# Patient Record
Sex: MALE | Race: WHITE | NOT HISPANIC OR LATINO | Employment: OTHER | URBAN - METROPOLITAN AREA
[De-identification: names, ages, dates, MRNs, and addresses within clinical notes are randomized per-mention and may not be internally consistent; named-entity substitution may affect disease eponyms.]

---

## 2023-07-12 PROBLEM — N52.9 ED (ERECTILE DYSFUNCTION): Status: ACTIVE | Noted: 2023-07-12

## 2023-07-12 PROBLEM — M25.561 RIGHT KNEE PAIN: Status: ACTIVE | Noted: 2023-07-12

## 2023-07-12 PROBLEM — E55.9 VITAMIN D DEFICIENCY: Status: ACTIVE | Noted: 2023-07-12

## 2023-07-12 PROBLEM — R53.83 FATIGUE: Status: ACTIVE | Noted: 2023-07-12

## 2023-07-12 PROBLEM — R73.9 HYPERGLYCEMIA: Status: ACTIVE | Noted: 2023-07-12

## 2023-07-12 PROBLEM — L40.9 PSORIASIS: Status: ACTIVE | Noted: 2023-07-12

## 2023-07-12 PROBLEM — I10 HYPERTENSION: Status: ACTIVE | Noted: 2023-07-12

## 2023-07-12 PROBLEM — R00.1 SINUS BRADYCARDIA: Status: ACTIVE | Noted: 2023-07-12

## 2023-07-12 PROBLEM — N40.0 ENLARGED PROSTATE WITHOUT LOWER URINARY TRACT SYMPTOMS (LUTS): Status: ACTIVE | Noted: 2023-07-12

## 2023-07-12 PROBLEM — E78.5 HYPERLIPIDEMIA: Status: ACTIVE | Noted: 2023-07-12

## 2023-07-12 PROBLEM — L20.9 ATOPIC DERMATITIS: Status: ACTIVE | Noted: 2023-07-12

## 2023-07-12 PROBLEM — G43.909 MIGRAINE HEADACHE: Status: ACTIVE | Noted: 2023-07-12

## 2023-07-12 PROBLEM — F32.A DEPRESSION: Status: ACTIVE | Noted: 2023-07-12

## 2023-07-12 PROBLEM — J32.9 SINUSITIS: Status: ACTIVE | Noted: 2023-07-12

## 2023-07-12 PROBLEM — L30.9 DERMATITIS: Status: ACTIVE | Noted: 2023-07-12

## 2023-07-12 PROBLEM — H33.20 RETINAL DETACHMENT: Status: ACTIVE | Noted: 2023-07-12

## 2023-07-12 PROBLEM — L01.02 PUSTULAR FOLLICULITIS: Status: ACTIVE | Noted: 2023-07-12

## 2023-07-12 PROBLEM — K62.5 RECTAL BLEED: Status: ACTIVE | Noted: 2023-07-12

## 2023-07-12 PROBLEM — G47.00 INSOMNIA: Status: ACTIVE | Noted: 2023-07-12

## 2023-07-12 PROBLEM — R63.5 ABNORMAL WEIGHT GAIN: Status: ACTIVE | Noted: 2023-07-12

## 2023-07-12 PROBLEM — K21.9 ESOPHAGEAL REFLUX: Status: ACTIVE | Noted: 2023-07-12

## 2023-07-12 PROBLEM — F41.9 ANXIETY: Status: ACTIVE | Noted: 2023-07-12

## 2023-07-12 RX ORDER — TACROLIMUS 1 MG/G
OINTMENT TOPICAL
COMMUNITY
Start: 2021-05-24

## 2023-07-12 RX ORDER — AMLODIPINE BESYLATE 5 MG/1
1 TABLET ORAL DAILY
COMMUNITY
Start: 2020-08-18 | End: 2023-08-11 | Stop reason: SDUPTHER

## 2023-07-12 RX ORDER — BETAMETHASONE VALERATE 1 MG/G
CREAM TOPICAL
COMMUNITY
Start: 2015-06-17

## 2023-07-12 RX ORDER — FINASTERIDE 5 MG/1
1 TABLET, FILM COATED ORAL DAILY
COMMUNITY
Start: 2013-04-15 | End: 2023-08-11 | Stop reason: SDUPTHER

## 2023-07-12 RX ORDER — RIZATRIPTAN BENZOATE 10 MG/1
TABLET, ORALLY DISINTEGRATING ORAL
COMMUNITY
Start: 2013-10-17 | End: 2023-08-11 | Stop reason: SDUPTHER

## 2023-07-12 RX ORDER — TRAZODONE HYDROCHLORIDE 100 MG/1
TABLET ORAL
COMMUNITY
Start: 2015-10-14 | End: 2023-08-11 | Stop reason: SDUPTHER

## 2023-07-12 RX ORDER — TADALAFIL 20 MG/1
TABLET ORAL
COMMUNITY
Start: 2019-06-25 | End: 2023-08-11 | Stop reason: ALTCHOICE

## 2023-07-12 RX ORDER — FLUOXETINE HYDROCHLORIDE 40 MG/1
1 CAPSULE ORAL DAILY
COMMUNITY
Start: 2017-10-23 | End: 2023-08-11 | Stop reason: SDUPTHER

## 2023-08-11 ENCOUNTER — LAB (OUTPATIENT)
Dept: LAB | Facility: LAB | Age: 68
End: 2023-08-11
Payer: MEDICARE

## 2023-08-11 ENCOUNTER — OFFICE VISIT (OUTPATIENT)
Dept: PRIMARY CARE | Facility: CLINIC | Age: 68
End: 2023-08-11
Payer: MEDICARE

## 2023-08-11 VITALS
HEIGHT: 73 IN | TEMPERATURE: 97.2 F | BODY MASS INDEX: 27.46 KG/M2 | DIASTOLIC BLOOD PRESSURE: 82 MMHG | RESPIRATION RATE: 16 BRPM | OXYGEN SATURATION: 98 % | HEART RATE: 63 BPM | WEIGHT: 207.2 LBS | SYSTOLIC BLOOD PRESSURE: 128 MMHG

## 2023-08-11 DIAGNOSIS — Z00.00 HEALTH CARE MAINTENANCE: ICD-10-CM

## 2023-08-11 DIAGNOSIS — Z00.00 ROUTINE GENERAL MEDICAL EXAMINATION AT HEALTH CARE FACILITY: ICD-10-CM

## 2023-08-11 DIAGNOSIS — I10 HYPERTENSION, UNSPECIFIED TYPE: ICD-10-CM

## 2023-08-11 DIAGNOSIS — Z00.00 MEDICARE ANNUAL WELLNESS VISIT, SUBSEQUENT: ICD-10-CM

## 2023-08-11 DIAGNOSIS — R53.83 TIREDNESS: ICD-10-CM

## 2023-08-11 DIAGNOSIS — G47.00 INSOMNIA, UNSPECIFIED TYPE: ICD-10-CM

## 2023-08-11 DIAGNOSIS — Z00.00 ROUTINE GENERAL MEDICAL EXAMINATION AT HEALTH CARE FACILITY: Primary | ICD-10-CM

## 2023-08-11 DIAGNOSIS — N40.1 BENIGN PROSTATIC HYPERPLASIA WITH LOWER URINARY TRACT SYMPTOMS, SYMPTOM DETAILS UNSPECIFIED: ICD-10-CM

## 2023-08-11 DIAGNOSIS — H35.3222 EXUDATIVE AGE-RELATED MACULAR DEGENERATION, LEFT EYE, WITH INACTIVE CHOROIDAL NEOVASCULARIZATION (MULTI): ICD-10-CM

## 2023-08-11 DIAGNOSIS — G43.819 OTHER MIGRAINE WITHOUT STATUS MIGRAINOSUS, INTRACTABLE: ICD-10-CM

## 2023-08-11 DIAGNOSIS — F41.9 ANXIETY: ICD-10-CM

## 2023-08-11 LAB
ALANINE AMINOTRANSFERASE (SGPT) (U/L) IN SER/PLAS: 10 U/L (ref 10–52)
ALBUMIN (G/DL) IN SER/PLAS: 4.3 G/DL (ref 3.4–5)
ALKALINE PHOSPHATASE (U/L) IN SER/PLAS: 32 U/L (ref 33–136)
ANION GAP IN SER/PLAS: 12 MMOL/L (ref 10–20)
APPEARANCE, URINE: CLEAR
ASPARTATE AMINOTRANSFERASE (SGOT) (U/L) IN SER/PLAS: 11 U/L (ref 9–39)
BILIRUBIN TOTAL (MG/DL) IN SER/PLAS: 0.9 MG/DL (ref 0–1.2)
BILIRUBIN, URINE: NEGATIVE
BLOOD, URINE: NEGATIVE
CALCIUM (MG/DL) IN SER/PLAS: 9.8 MG/DL (ref 8.6–10.3)
CARBON DIOXIDE, TOTAL (MMOL/L) IN SER/PLAS: 31 MMOL/L (ref 21–32)
CHLORIDE (MMOL/L) IN SER/PLAS: 105 MMOL/L (ref 98–107)
CHOLESTEROL (MG/DL) IN SER/PLAS: 224 MG/DL (ref 0–199)
CHOLESTEROL IN HDL (MG/DL) IN SER/PLAS: 50.5 MG/DL
CHOLESTEROL/HDL RATIO: 4.4
COLOR, URINE: YELLOW
CREATININE (MG/DL) IN SER/PLAS: 1.03 MG/DL (ref 0.5–1.3)
ERYTHROCYTE DISTRIBUTION WIDTH (RATIO) BY AUTOMATED COUNT: 12.1 % (ref 11.5–14.5)
ERYTHROCYTE MEAN CORPUSCULAR HEMOGLOBIN CONCENTRATION (G/DL) BY AUTOMATED: 33.3 G/DL (ref 32–36)
ERYTHROCYTE MEAN CORPUSCULAR VOLUME (FL) BY AUTOMATED COUNT: 91 FL (ref 80–100)
ERYTHROCYTES (10*6/UL) IN BLOOD BY AUTOMATED COUNT: 5.11 X10E12/L (ref 4.5–5.9)
GFR MALE: 78 ML/MIN/1.73M2
GLUCOSE (MG/DL) IN SER/PLAS: 106 MG/DL (ref 74–99)
GLUCOSE, URINE: NEGATIVE MG/DL
HEMATOCRIT (%) IN BLOOD BY AUTOMATED COUNT: 46.3 % (ref 41–52)
HEMOGLOBIN (G/DL) IN BLOOD: 15.4 G/DL (ref 13.5–17.5)
KETONES, URINE: NEGATIVE MG/DL
LDL: 146 MG/DL (ref 0–99)
LEUKOCYTE ESTERASE, URINE: NEGATIVE
LEUKOCYTES (10*3/UL) IN BLOOD BY AUTOMATED COUNT: 5.4 X10E9/L (ref 4.4–11.3)
NITRITE, URINE: NEGATIVE
PH, URINE: 8 (ref 5–8)
PLATELETS (10*3/UL) IN BLOOD AUTOMATED COUNT: 163 X10E9/L (ref 150–450)
POTASSIUM (MMOL/L) IN SER/PLAS: 4.7 MMOL/L (ref 3.5–5.3)
PROTEIN TOTAL: 6.4 G/DL (ref 6.4–8.2)
PROTEIN, URINE: NEGATIVE MG/DL
SODIUM (MMOL/L) IN SER/PLAS: 143 MMOL/L (ref 136–145)
SPECIFIC GRAVITY, URINE: 1.01 (ref 1–1.03)
THYROTROPIN (MIU/L) IN SER/PLAS BY DETECTION LIMIT <= 0.05 MIU/L: 1.4 MIU/L (ref 0.44–3.98)
TRIGLYCERIDE (MG/DL) IN SER/PLAS: 139 MG/DL (ref 0–149)
UREA NITROGEN (MG/DL) IN SER/PLAS: 12 MG/DL (ref 6–23)
UROBILINOGEN, URINE: <2 MG/DL (ref 0–1.9)
VLDL: 28 MG/DL (ref 0–40)

## 2023-08-11 PROCEDURE — G0442 ANNUAL ALCOHOL SCREEN 15 MIN: HCPCS | Performed by: INTERNAL MEDICINE

## 2023-08-11 PROCEDURE — 93000 ELECTROCARDIOGRAM COMPLETE: CPT | Performed by: INTERNAL MEDICINE

## 2023-08-11 PROCEDURE — 84443 ASSAY THYROID STIM HORMONE: CPT

## 2023-08-11 PROCEDURE — 36415 COLL VENOUS BLD VENIPUNCTURE: CPT

## 2023-08-11 PROCEDURE — 99397 PER PM REEVAL EST PAT 65+ YR: CPT | Performed by: INTERNAL MEDICINE

## 2023-08-11 PROCEDURE — 80053 COMPREHEN METABOLIC PANEL: CPT

## 2023-08-11 PROCEDURE — 1036F TOBACCO NON-USER: CPT | Performed by: INTERNAL MEDICINE

## 2023-08-11 PROCEDURE — 3079F DIAST BP 80-89 MM HG: CPT | Performed by: INTERNAL MEDICINE

## 2023-08-11 PROCEDURE — 85027 COMPLETE CBC AUTOMATED: CPT

## 2023-08-11 PROCEDURE — G0439 PPPS, SUBSEQ VISIT: HCPCS | Performed by: INTERNAL MEDICINE

## 2023-08-11 PROCEDURE — G0444 DEPRESSION SCREEN ANNUAL: HCPCS | Performed by: INTERNAL MEDICINE

## 2023-08-11 PROCEDURE — 84154 ASSAY OF PSA FREE: CPT

## 2023-08-11 PROCEDURE — 3074F SYST BP LT 130 MM HG: CPT | Performed by: INTERNAL MEDICINE

## 2023-08-11 PROCEDURE — 1170F FXNL STATUS ASSESSED: CPT | Performed by: INTERNAL MEDICINE

## 2023-08-11 PROCEDURE — 84153 ASSAY OF PSA TOTAL: CPT

## 2023-08-11 PROCEDURE — 81003 URINALYSIS AUTO W/O SCOPE: CPT

## 2023-08-11 PROCEDURE — 99497 ADVNCD CARE PLAN 30 MIN: CPT | Performed by: INTERNAL MEDICINE

## 2023-08-11 PROCEDURE — 80061 LIPID PANEL: CPT

## 2023-08-11 PROCEDURE — 1159F MED LIST DOCD IN RCRD: CPT | Performed by: INTERNAL MEDICINE

## 2023-08-11 RX ORDER — AMLODIPINE BESYLATE 5 MG/1
5 TABLET ORAL DAILY
Qty: 90 TABLET | Refills: 3 | Status: SHIPPED | OUTPATIENT
Start: 2023-08-11

## 2023-08-11 RX ORDER — RIZATRIPTAN BENZOATE 10 MG/1
10 TABLET, ORALLY DISINTEGRATING ORAL ONCE AS NEEDED
Qty: 9 TABLET | Refills: 3 | Status: SHIPPED | OUTPATIENT
Start: 2023-08-11 | End: 2023-09-16

## 2023-08-11 RX ORDER — TRAZODONE HYDROCHLORIDE 100 MG/1
100 TABLET ORAL NIGHTLY
Qty: 90 TABLET | Refills: 3 | Status: SHIPPED | OUTPATIENT
Start: 2023-08-11

## 2023-08-11 RX ORDER — FINASTERIDE 5 MG/1
5 TABLET, FILM COATED ORAL DAILY
Qty: 90 TABLET | Refills: 3 | Status: SHIPPED | OUTPATIENT
Start: 2023-08-11

## 2023-08-11 RX ORDER — FLUOXETINE HYDROCHLORIDE 40 MG/1
40 CAPSULE ORAL DAILY
Qty: 90 CAPSULE | Refills: 3 | Status: SHIPPED | OUTPATIENT
Start: 2023-08-11

## 2023-08-11 ASSESSMENT — ACTIVITIES OF DAILY LIVING (ADL)
DRESSING: INDEPENDENT
DOING_HOUSEWORK: INDEPENDENT
MANAGING_FINANCES: INDEPENDENT
GROCERY_SHOPPING: INDEPENDENT
BATHING: INDEPENDENT
TAKING_MEDICATION: INDEPENDENT

## 2023-08-11 ASSESSMENT — PATIENT HEALTH QUESTIONNAIRE - PHQ9
2. FEELING DOWN, DEPRESSED OR HOPELESS: NOT AT ALL
SUM OF ALL RESPONSES TO PHQ9 QUESTIONS 1 AND 2: 0
1. LITTLE INTEREST OR PLEASURE IN DOING THINGS: NOT AT ALL

## 2023-08-11 NOTE — PROGRESS NOTES
Chief Complaint:   Medicare Wellness Exam/Comprehensive Problem Focused Follow Up and Physical Exam    HPI:  68-year-old patient presenting to the office today for his Medicare wellness subsequent visit as well as his annual examination.    Patient is doing fairly well he spends majority of his time in [place] and he comes back to [place] in the summer.  He denies any chest pain and shortness of breath not even on exertion, he denies abdominal pain nausea vomiting changes in bowel movements or blood in the stool, he denies urine symptoms.  He has no specific complaints or concerns today.  Assessment and Plan:  Problem List Items Addressed This Visit    None  68-year-old patient with the following issues.    1.  Essential benign hypertension, patient's blood pressure is adequate continue with the current medication no changes.  Refill on the amlodipine was provided.    2.  History of generalized anxiety disorder controlled with the use of fluoxetine the plan is to continue no changes.    3.  Benign prostatic hypertrophy stable with the use of finasteride refill was provided.    4.  Health maintenance issues, patient is up-to-date on his colonoscopy, PSA will be completed for prostate cancer screening.  Blood work will be obtained and we will discuss the results with the patient once available.  He is up-to-date on his vaccination.    5.  Medicare wellness visit was completed in office EKG and urine analysis was obtained although Medicare questions were answered.    Disposition, I will see the patient back in the office in 1 year for repeat physical and repeat Medicare wellness subsequent visit.  I will see him sooner if needed.  Refills of his medications were provided.    Patient Care Team:  Sandra Ramsey MD as PCP - General (Internal Medicine)  Milana Cheng MD as PCP - [facility] ACO Attributed Provider     Active Problem List  Patient Active Problem List   Diagnosis    Abnormal weight gain    Anxiety     Atopic dermatitis    Depression    ED (erectile dysfunction)    Enlarged prostate without lower urinary tract symptoms (luts)    Esophageal reflux    Fatigue    Hyperglycemia    Hyperlipidemia    Hypertension    Insomnia    Migraine headache    Dermatitis    Psoriasis    Pustular folliculitis    Rectal bleed    Retinal detachment    Right knee pain    Sinus bradycardia    Sinusitis    Vitamin D deficiency       Comprehensive Medical/Surgical/Social/Family History  Past Medical History:   Diagnosis Date    Allergic     Anxiety disorder, unspecified 06/14/2022    Anxiety    Eczema     GERD (gastroesophageal reflux disease)     Headache     Hypertension     Inflammatory bowel disease     Personal history of other diseases of the circulatory system     History of hypertension    Personal history of other mental and behavioral disorders     History of depression     Past Surgical History:   Procedure Laterality Date    COLON SURGERY      EYE SURGERY      TONSILLECTOMY      VASECTOMY      WISDOM TOOTH EXTRACTION       Social History     Social History Narrative    Not on file     Tobacco/Alcohol/Opioid use, as well as Illicit Drug Use was screened for/reviewed and documented in Social Documentation section of the chart and medication list as appropriate    Allergies and Medications  Lisinopril and Penicillins  Current Outpatient Medications   Medication Instructions    amLODIPine (Norvasc) 5 mg tablet 1 tablet, oral, Daily    betamethasone valerate (Valisone) 0.1 % cream Topical    finasteride (Proscar) 5 mg tablet 1 tablet, oral, Daily    FLUoxetine (PROzac) 40 mg capsule 1 tablet, oral, Daily    rizatriptan MLT (Maxalt-MLT) 10 mg disintegrating tablet oral    tacrolimus (Protopic) 0.1 % ointment Daily RT    traZODone (Desyrel) 100 mg tablet oral     Medications and Supplements  prescribed by me and other practitioners or clinical pharmacist (such as prescriptions, OTC's, herbal therapies and supplements) were reviewed  and documented in the medical record.      Activities of Daily Living  In your present state of health, do you have any difficulty performing the following activities?:   Preparing food and eating?: No  Bathing yourself: No  Getting dressed: No  Using the toilet:No  Moving around from place to place: No  In the past year have you fallen or had a near fall?:No  Able to manage finances independently: Yes  Able to perform grocery shopping: Yes  Able to manage medications independently: Yes  Able to do housework independently: Yes  Patient self-assessment of health status? Excellent    Depression Screen  Depression screening (15m) completed using the PHQ-2 questions with results documented in the chart/encounter  (See note and/or Rooming Screenings for documentation)    Current exercise habits:    Dietary issues discussed: Yes  Hearing difficulties: No  Safe in current home environment: Yes  Visual Acuity assessed: Yes  Cognitive Impairment No    Advance directives  Advanced Care Planning (including a Living Will, Healthcare POA, as well as specific end of life choices and/or directives), was discussed (~16 min) with the patient and/or surrogate, voluntarily, and details of that discussion documented in the Problem List (under Advanced Directives Discussion) of the medical record. Patient was encouraged to bring a copy of advanced directives to be placed in medical chart.    Cardiac Risk Assessment  Cardiovascular risk was discussed and, if needed, lifestyle modifications recommended, including nutritional choices, exercise, and elimination of habits contributing to risk. We agreed on a plan to reduce the current cardiovascular risk based on above discussion as needed.  Aspirin use/disuse was discussed and documented in the Problem List of the medical record (under Cardiac Risk Counseling) after reviewing the updated guidelines below:    Consider low dose Aspirin ( mg) use if the benefit for cardiovascular disease  "prevention outweighs risk for bleeding complications.   In general, low dose ASA should be considered:  In patients WITHOUT prior MI/stroke/PAD (primary prevention):   a. Age <60: Use if 10-year cardiovascular disease risk >20%, with discussion of risks and benefits with patient  b. Age 60-<70: Use if 10-year cardiovascular disease risk >20% and low bleeding (e.g., gastrointenstinal) risk, with discussion of risks and benefits with patient  c. Age >=70: Do not use    In patients WITH prior MI/stroke/PAD (secondary prevention):   Generally use unless extremely high bleeding (e.g., gastrointenstinal) risk, with discussion of risks and benefits with patient    ROS otherwise negative aside from what was mentioned above in HPI.    Vitals  /82   Pulse 63   Temp 36.2 °C (97.2 °F)   Resp 16   Ht 1.842 m (6' 0.5\")   Wt 94 kg (207 lb 3.2 oz)   SpO2 98%   BMI 27.72 kg/m²   Body mass index is 27.72 kg/m².  Physical Exam  Gen: Alert, NAD  HEENT:  Unremarkable  Neck:  No EZE  Respiratory:  Lungs CTAB  Cardiovascular:  Heart RRR  Neuro:  Gross motor and sensory intact  Skin:  No suspicious lesions present  Breast: No masses, or axillary lymphadenopathy      During the course of the visit the patient was educated and counseled about age appropriate screening and preventive services. Completed preventive screenings were documented in the chart and orders were placed for outstanding screenings/procedures as documented in the Assessment and Plan.    Patient Instructions (the written plan) was given to the patient at check out.  Subjective   Reason for Visit: Anirudh Mobley is an 68 y.o. male here for a Medicare Wellness visit.          Reviewed all medications by prescribing practitioner or clinical pharmacist (such as prescriptions, OTCs, herbal therapies and supplements) and documented in the medical record.    HPI    Patient Care Team:  Sandra Ramsey MD as PCP - General (Internal Medicine)  Milana Cheng MD " "as PCP - [facility] Attributed Provider     Review of Systems    Objective   Vitals:  /82   Pulse 63   Temp 36.2 °C (97.2 °F)   Resp 16   Ht 1.842 m (6' 0.5\")   Wt 94 kg (207 lb 3.2 oz)   SpO2 98%   BMI 27.72 kg/m²       Physical Exam    Assessment/Plan   Problem List Items Addressed This Visit          Cardiac and Vasculature    Hypertension    Relevant Medications    amLODIPine (Norvasc) 5 mg tablet       Eye    Exudative age-related macular degeneration, left eye, with inactive choroidal neovascularization (CMS/HCC)       Mental Health    Anxiety    Relevant Medications    FLUoxetine (PROzac) 40 mg capsule       Neuro    Migraine headache    Relevant Medications    rizatriptan MLT (Maxalt-MLT) 10 mg disintegrating tablet       Sleep    Insomnia    Relevant Medications    traZODone (Desyrel) 100 mg tablet     Other Visit Diagnoses       Routine general medical examination at health care facility    -  Primary    Health care maintenance        Relevant Orders    CBC    Comprehensive Metabolic Panel    Lipid Panel    PSA, Total and Free    TSH with reflex to Free T4 if abnormal    Follow Up In Advanced Primary Care - PCP [facility] Maintenance    Tiredness        Relevant Orders    CBC    Benign prostatic hyperplasia with lower urinary tract symptoms, symptom details unspecified        Relevant Medications    finasteride (Proscar) 5 mg tablet    Medicare annual wellness visit, subsequent        Relevant Orders    Follow Up In Advanced Primary Care - PCP - Medicare Annual    ECG 12 lead (Clinic Performed)    POCT UA Automated manually resulted               "

## 2023-08-14 LAB
PROSTATE SPECIFIC AG (NG/ML) IN SER/PLAS: 1.3 NG/ML (ref 0–4)
PROSTATE SPECIFIC AG FREE (NG/ML) IN SER/PLAS: 0.2 NG/ML
PROSTATE SPECIFIC AG FREE/PROSTATE SPECIFIC AG TOTAL IN SER/PLAS: 15 %

## 2024-08-15 ENCOUNTER — APPOINTMENT (OUTPATIENT)
Dept: PRIMARY CARE | Facility: CLINIC | Age: 69
End: 2024-08-15
Payer: MEDICARE

## 2024-08-15 ENCOUNTER — LAB (OUTPATIENT)
Dept: LAB | Facility: LAB | Age: 69
End: 2024-08-15
Payer: MEDICARE

## 2024-08-15 VITALS
SYSTOLIC BLOOD PRESSURE: 160 MMHG | BODY MASS INDEX: 28.31 KG/M2 | OXYGEN SATURATION: 96 % | HEIGHT: 73 IN | DIASTOLIC BLOOD PRESSURE: 90 MMHG | HEART RATE: 54 BPM | WEIGHT: 213.6 LBS

## 2024-08-15 DIAGNOSIS — Z00.00 ROUTINE HEALTH MAINTENANCE: Primary | ICD-10-CM

## 2024-08-15 DIAGNOSIS — G51.4 FACIAL TWITCHING: ICD-10-CM

## 2024-08-15 DIAGNOSIS — H35.3222 EXUDATIVE AGE-RELATED MACULAR DEGENERATION, LEFT EYE, WITH INACTIVE CHOROIDAL NEOVASCULARIZATION (MULTI): ICD-10-CM

## 2024-08-15 DIAGNOSIS — G43.819 OTHER MIGRAINE WITHOUT STATUS MIGRAINOSUS, INTRACTABLE: ICD-10-CM

## 2024-08-15 DIAGNOSIS — G47.00 INSOMNIA, UNSPECIFIED TYPE: ICD-10-CM

## 2024-08-15 DIAGNOSIS — Z13.6 SCREENING FOR CARDIOVASCULAR CONDITION: ICD-10-CM

## 2024-08-15 DIAGNOSIS — R25.1 TREMOR: ICD-10-CM

## 2024-08-15 DIAGNOSIS — I10 HYPERTENSION, UNSPECIFIED TYPE: ICD-10-CM

## 2024-08-15 DIAGNOSIS — Z00.00 ROUTINE HEALTH MAINTENANCE: ICD-10-CM

## 2024-08-15 DIAGNOSIS — F41.9 ANXIETY: ICD-10-CM

## 2024-08-15 DIAGNOSIS — N40.1 BENIGN PROSTATIC HYPERPLASIA WITH LOWER URINARY TRACT SYMPTOMS, SYMPTOM DETAILS UNSPECIFIED: ICD-10-CM

## 2024-08-15 DIAGNOSIS — J30.9 ALLERGIC RHINITIS, UNSPECIFIED SEASONALITY, UNSPECIFIED TRIGGER: ICD-10-CM

## 2024-08-15 DIAGNOSIS — Z00.00 MEDICARE ANNUAL WELLNESS VISIT, SUBSEQUENT: ICD-10-CM

## 2024-08-15 LAB
ALBUMIN SERPL BCP-MCNC: 4.2 G/DL (ref 3.4–5)
ALP SERPL-CCNC: 36 U/L (ref 33–136)
ALT SERPL W P-5'-P-CCNC: 10 U/L (ref 10–52)
ANION GAP SERPL CALC-SCNC: 12 MMOL/L (ref 10–20)
AST SERPL W P-5'-P-CCNC: 10 U/L (ref 9–39)
BILIRUB SERPL-MCNC: 0.9 MG/DL (ref 0–1.2)
BUN SERPL-MCNC: 14 MG/DL (ref 6–23)
CALCIUM SERPL-MCNC: 9.2 MG/DL (ref 8.6–10.6)
CHLORIDE SERPL-SCNC: 101 MMOL/L (ref 98–107)
CHOLEST SERPL-MCNC: 216 MG/DL (ref 0–199)
CHOLESTEROL/HDL RATIO: 3.8
CO2 SERPL-SCNC: 30 MMOL/L (ref 21–32)
CREAT SERPL-MCNC: 1.02 MG/DL (ref 0.5–1.3)
EGFRCR SERPLBLD CKD-EPI 2021: 79 ML/MIN/1.73M*2
ERYTHROCYTE [DISTWIDTH] IN BLOOD BY AUTOMATED COUNT: 12.3 % (ref 11.5–14.5)
GLUCOSE SERPL-MCNC: 105 MG/DL (ref 74–99)
HCT VFR BLD AUTO: 48.5 % (ref 41–52)
HDLC SERPL-MCNC: 56.9 MG/DL
HGB BLD-MCNC: 16.2 G/DL (ref 13.5–17.5)
LDLC SERPL CALC-MCNC: 134 MG/DL
MCH RBC QN AUTO: 30.1 PG (ref 26–34)
MCHC RBC AUTO-ENTMCNC: 33.4 G/DL (ref 32–36)
MCV RBC AUTO: 90 FL (ref 80–100)
NON HDL CHOLESTEROL: 159 MG/DL (ref 0–149)
NRBC BLD-RTO: 0 /100 WBCS (ref 0–0)
PLATELET # BLD AUTO: 222 X10*3/UL (ref 150–450)
POTASSIUM SERPL-SCNC: 4 MMOL/L (ref 3.5–5.3)
PROT SERPL-MCNC: 6.5 G/DL (ref 6.4–8.2)
RBC # BLD AUTO: 5.39 X10*6/UL (ref 4.5–5.9)
SODIUM SERPL-SCNC: 139 MMOL/L (ref 136–145)
TRIGL SERPL-MCNC: 126 MG/DL (ref 0–149)
TSH SERPL-ACNC: 1.28 MIU/L (ref 0.44–3.98)
VLDL: 25 MG/DL (ref 0–40)
WBC # BLD AUTO: 6.5 X10*3/UL (ref 4.4–11.3)

## 2024-08-15 PROCEDURE — 84153 ASSAY OF PSA TOTAL: CPT

## 2024-08-15 PROCEDURE — 36415 COLL VENOUS BLD VENIPUNCTURE: CPT

## 2024-08-15 PROCEDURE — 80053 COMPREHEN METABOLIC PANEL: CPT

## 2024-08-15 PROCEDURE — 85027 COMPLETE CBC AUTOMATED: CPT

## 2024-08-15 PROCEDURE — 80061 LIPID PANEL: CPT

## 2024-08-15 PROCEDURE — 84443 ASSAY THYROID STIM HORMONE: CPT

## 2024-08-15 PROCEDURE — 84154 ASSAY OF PSA FREE: CPT

## 2024-08-15 RX ORDER — FLUOXETINE HYDROCHLORIDE 40 MG/1
40 CAPSULE ORAL DAILY
Qty: 90 CAPSULE | Refills: 3 | Status: SHIPPED | OUTPATIENT
Start: 2024-08-15

## 2024-08-15 RX ORDER — FINASTERIDE 5 MG/1
5 TABLET, FILM COATED ORAL DAILY
Qty: 90 TABLET | Refills: 3 | Status: SHIPPED | OUTPATIENT
Start: 2024-08-15

## 2024-08-15 RX ORDER — AMLODIPINE BESYLATE 5 MG/1
5 TABLET ORAL DAILY
Qty: 90 TABLET | Refills: 3 | Status: SHIPPED | OUTPATIENT
Start: 2024-08-15

## 2024-08-15 RX ORDER — TRAZODONE HYDROCHLORIDE 100 MG/1
100 TABLET ORAL NIGHTLY
Qty: 90 TABLET | Refills: 3 | Status: SHIPPED | OUTPATIENT
Start: 2024-08-15

## 2024-08-15 ASSESSMENT — ACTIVITIES OF DAILY LIVING (ADL)
DRESSING: INDEPENDENT
DOING_HOUSEWORK: INDEPENDENT
TAKING_MEDICATION: INDEPENDENT
MANAGING_FINANCES: INDEPENDENT
BATHING: INDEPENDENT
GROCERY_SHOPPING: INDEPENDENT

## 2024-08-15 ASSESSMENT — PATIENT HEALTH QUESTIONNAIRE - PHQ9
SUM OF ALL RESPONSES TO PHQ9 QUESTIONS 1 AND 2: 0
1. LITTLE INTEREST OR PLEASURE IN DOING THINGS: NOT AT ALL
2. FEELING DOWN, DEPRESSED OR HOPELESS: NOT AT ALL

## 2024-08-15 NOTE — PROGRESS NOTES
Annual Medicare Wellness Exam/Comprehensive Problem Focused Follow Up  HPI/CC  Chief Complaint   Patient presents with    Medicare Annual Wellness Visit Subsequent   69-year-old patient presented to the office today for his Medicare wellness as well as his annual exam.  Patient is known to have history of hypertension as well as history of anxiety.  He seems to be doing fairly well he denies any chest pain or difficulty breathing not even on exertion he denies abdominal pain nausea or vomiting changes in the bowel movement or blood in his stool he denies urine symptoms.    Him and his wife shared concerns about possible memory changes or cognitive concerns and they are interested in proceeding with neuropsych testing.    His wife shared concerns that he has some new twitching in his eyebrows and he used to have some twitching in his lips and he had never had that evaluated also she shared concerns regarding tremors as well.    Patient shared with me his excessive alcohol consumption he drinks 4-5 alcoholic beverages every night and he has been doing this for at least 10 years he is very adamant that this is not giving him any harm in any way and he wants to continue despite the counseling we had today about the risk of excessive alcohol consumption on the liver and the other comorbidities associated with it.    Assessment and Plan:  Problem List Items Addressed This Visit    None  Visit Diagnoses       Medicare annual wellness visit, subsequent        Routine health maintenance        Relevant Orders    ECG 12 lead (Clinic Performed)          69-year-old patient with the following issues.    1.  Hypertension patient's blood pressure is elevated he does me at home it is not elevated so he is going to bring his machine and his readings to my office within the next 2 to 4 weeks for follow-up for comparison to confirm the accuracy.  No changes today.    2.  Anxiety on fluoxetine the plan is to continue.    3.  Insomnia  "on trazodone the plan is to continue.    4.  Concerns regarding facial muscle twitching and tremors referred to neurology was provided.    5.  Concerns regarding allergic rhinosinusitis symptoms patient is interested in meeting with the allergy department referral was provided.    6.  Migraine headaches less frequent and less intense no changes on his current regimen.    7.  Hyperlipidemia managed with diet and lifestyle modification we will see what his lipid profile is and we have discussed the treatment plan with him I would like to proceed with calcium scoring and that will factor into our discussion regarding treatment and risk factor modification.    8.  Healthcare maintenance issues lab work is requested PSA is requested colon cancer screening is up-to-date.    9.  Excessive alcohol consumption I did spend significant amount of time over 10 to 15 minutes today educating the patient about the risk of comorbidities associated with excessive alcohol consumption and he stated understanding liver function test will be obtained.    10.  Referral to neuropsych was provided for evaluation of memory concerns and cognitive concerns.    Medicare wellness visit was completed with its requirements.    No other active issues or concerns I will see the patient back in 1 week for repeat physical and in 4 to 6 weeks for follow-up with his blood pressure machine.  ROS otherwise negative aside from what was mentioned above in HPI.    Vitals  /86 (BP Location: Right arm, Patient Position: Sitting, BP Cuff Size: Adult)   Pulse 54   Ht 1.842 m (6' 0.5\")   Wt 96.9 kg (213 lb 9.6 oz)   SpO2 96%   BMI 28.57 kg/m²   Body mass index is 28.57 kg/m².  Physical Exam  Gen: Alert, NAD  HEENT:  Unremarkable  Neck:  No EZE  Respiratory:  Lungs CTAB  Cardiovascular:  Heart RRR  Neuro:  Gross motor and sensory intact  Skin:  No suspicious lesions present  Breast: No masses, or axillary lymphadenopathy      Patient Care Team:  Sandra" MD Roxy as PCP - General (Internal Medicine)  Sandra Ramsey MD as PCP - [facility] ACO Attributed Provider       Activities of Daily Living  In your present state of health, do you have any difficulty performing the following activities?:   Preparing food and eating?: No  Bathing yourself: No  Getting dressed: No  Using the toilet:No  Moving around from place to place: No  In the past year have you fallen or had a near fall?:No  Able to manage finances independently: Yes  Able to perform grocery shopping: Yes  Able to manage medications independently: Yes  Able to do housework independently: Yes  Patient self-assessment of health status? Good    Current exercise habits:  walking   Dietary issues discussed: Yes  Hearing difficulties: No  Safe in current home environment: Yes  Visual Acuity assessed: Yes  Cognitive Impairment No    5 minutes spent on SDOH screening:   Specifically Housing, Food Insecurity, Utilities and Transportatin Needs were evaluated   (See Screenings in Rooming section for documentation)  Food Insecurity: Not on file     Housing Stability: Not on file     Transportation Needs: Not on file       Allergies and Medications  Lisinopril and Penicillins  Current Outpatient Medications   Medication Instructions    amLODIPine (NORVASC) 5 mg, oral, Daily    betamethasone valerate (Valisone) 0.1 % cream Topical    finasteride (PROSCAR) 5 mg, oral, Daily    FLUoxetine (PROZAC) 40 mg, oral, Daily    rizatriptan MLT (MAXALT-MLT) 10 mg, oral, Once as needed    tacrolimus (Protopic) 0.1 % ointment Daily RT    traZODone (DESYREL) 100 mg, oral, Nightly       Medications and Supplements  prescribed by me and other practitioners or clinical pharmacist (such as prescriptions, OTC's, herbal therapies and supplements) were reviewed and documented in the medical record.      Active Problem List  Patient Active Problem List   Diagnosis    Abnormal weight gain    Anxiety    Atopic dermatitis    Depression    ED  (erectile dysfunction)    Enlarged prostate without lower urinary tract symptoms (luts)    Esophageal reflux    Fatigue    Hyperglycemia    Hyperlipidemia    Hypertension    Insomnia    Migraine headache    Dermatitis    Psoriasis    Pustular folliculitis    Rectal bleed    Retinal detachment    Right knee pain    Sinus bradycardia    Sinusitis    Vitamin D deficiency    Exudative age-related macular degeneration, left eye, with inactive choroidal neovascularization (Multi)       Comprehensive Medical/Surgical/Social/Family History  Past Medical History:   Diagnosis Date    Allergic     Anxiety disorder, unspecified 06/14/2022    Anxiety    Eczema     GERD (gastroesophageal reflux disease)     Headache     Hypertension     Inflammatory bowel disease     Personal history of other diseases of the circulatory system     History of hypertension    Personal history of other mental and behavioral disorders     History of depression     Past Surgical History:   Procedure Laterality Date    COLON SURGERY      EYE SURGERY      TONSILLECTOMY      VASECTOMY      WISDOM TOOTH EXTRACTION       Social History     Social History Narrative    Not on file         Tobacco/Alcohol/Opioid use, as well as Illicit Drug Use was screened for/reviewed and documented in Social Documentation section of the chart and medication list as appropriate     Depression Screening  Depression screening completed using the PHQ-2 questions, with results documented in the chart/encounter (5 min spent on this).  (See Rooming Screening section for documentation, and/or progress note for additional information)    Cardiac Risk Assessment (15 minutes spent on this)  The 10-year ASCVD risk score (Ha AHMADI, et al., 2019) is: 26.8%    Values used to calculate the score:      Age: 69 years      Sex: Male      Is Non- : No      Diabetic: No      Tobacco smoker: No      Systolic Blood Pressure: 140 mmHg      Is BP treated: Yes      HDL  Cholesterol: 50.5 mg/dL      Total Cholesterol: 224 mg/dL    Cardiovascular risk was discussed and, if needed, lifestyle modifications recommended, including nutritional choices, exercise, and elimination of habits contributing to risk. We agreed on a plan to reduce the current cardiovascular risk based on above discussion as needed.     Aspirin use/disuse was discussed and documented in the Problem List of the medical record (under Cardiac Risk Counseling) after reviewing the updated guidelines below:  Consider low dose Aspirin ( mg) use if the benefit for cardiovascular disease prevention outweighs risk for bleeding complications.   Discussed that in general, low dose ASA should be considered:  In patients WITHOUT prior MI/stroke/PAD (primary prevention):   a. Age <60: Use if 10-year cardiovascular disease risk >20%, with discussion of risks and benefits with patient  b. Age 60-<70: Use if 10-year cardiovascular disease risk >20% and low bleeding (e.g., gastrointestinal) risk, with discussion of risks and benefits with patient  c. Age >=70: Do not use    In patients WITH prior MI/stroke/PAD (secondary prevention):   Generally use unless extremely high bleeding (e.g., gastrointenstinal) risk, with discussion of risks and benefits with patient    Advance Directives Discussion  Advanced Care Planning (including a Living Will, Healthcare POA, as well as specific end of life choices and/or directives), was discussed with the patient and/or surrogate, voluntarily, and details of that discussion documented in the Problem List (under Advanced Directives Discussion) of the medical record.   (16 min spent discussing above)     During the course of the visit the patient was educated and counseled about age appropriate screening and preventive services.   Completed preventive screenings were documented in the chart (see Routine Health Maintenance in Problem List) and orders were placed for outstanding  screenings/procedures as documented in the Assessment and Plan.    Patient Instructions (the written plan) was given to the patient at check out as part of the AVS.

## 2024-08-18 LAB
PSA FREE MFR SERPL: 14 %
PSA FREE SERPL-MCNC: 0.2 NG/ML
PSA SERPL IA-MCNC: 1.4 NG/ML (ref 0–4)

## 2024-08-22 ENCOUNTER — PATIENT MESSAGE (OUTPATIENT)
Dept: PRIMARY CARE | Facility: CLINIC | Age: 69
End: 2024-08-22
Payer: MEDICARE

## 2024-08-22 DIAGNOSIS — R41.89 COGNITIVE DECLINE: Primary | ICD-10-CM

## 2024-08-22 DIAGNOSIS — R41.3 MEMORY CHANGES: ICD-10-CM

## 2024-08-29 NOTE — PATIENT COMMUNICATION
Spoke with patient wife. Chasity is requesting a neuropsychology referral for herself and one for Anirudh for cognitive function evaluations.

## 2024-09-26 ENCOUNTER — HOSPITAL ENCOUNTER (OUTPATIENT)
Dept: RADIOLOGY | Facility: HOSPITAL | Age: 69
Discharge: HOME | End: 2024-09-26
Payer: MEDICARE

## 2024-09-26 DIAGNOSIS — R93.1 ELEVATED CORONARY ARTERY CALCIUM SCORE: Primary | ICD-10-CM

## 2024-09-26 DIAGNOSIS — Z13.6 SCREENING FOR CARDIOVASCULAR CONDITION: ICD-10-CM

## 2024-09-26 PROCEDURE — 75571 CT HRT W/O DYE W/CA TEST: CPT

## 2024-09-30 ENCOUNTER — APPOINTMENT (OUTPATIENT)
Dept: ALLERGY | Facility: CLINIC | Age: 69
End: 2024-09-30
Payer: MEDICARE

## 2024-10-16 ENCOUNTER — APPOINTMENT (OUTPATIENT)
Dept: CARDIOLOGY | Facility: CLINIC | Age: 69
End: 2024-10-16
Payer: MEDICARE

## 2024-10-16 VITALS
HEART RATE: 64 BPM | DIASTOLIC BLOOD PRESSURE: 74 MMHG | BODY MASS INDEX: 28.71 KG/M2 | SYSTOLIC BLOOD PRESSURE: 136 MMHG | WEIGHT: 216.6 LBS | HEIGHT: 73 IN | OXYGEN SATURATION: 97 %

## 2024-10-16 DIAGNOSIS — R93.1 ELEVATED CORONARY ARTERY CALCIUM SCORE: Primary | ICD-10-CM

## 2024-10-16 DIAGNOSIS — E78.2 MIXED HYPERLIPIDEMIA: ICD-10-CM

## 2024-10-16 PROCEDURE — 1123F ACP DISCUSS/DSCN MKR DOCD: CPT | Performed by: NURSE PRACTITIONER

## 2024-10-16 PROCEDURE — 3075F SYST BP GE 130 - 139MM HG: CPT | Performed by: NURSE PRACTITIONER

## 2024-10-16 PROCEDURE — 1036F TOBACCO NON-USER: CPT | Performed by: NURSE PRACTITIONER

## 2024-10-16 PROCEDURE — 1160F RVW MEDS BY RX/DR IN RCRD: CPT | Performed by: NURSE PRACTITIONER

## 2024-10-16 PROCEDURE — 99204 OFFICE O/P NEW MOD 45 MIN: CPT | Performed by: NURSE PRACTITIONER

## 2024-10-16 PROCEDURE — 3078F DIAST BP <80 MM HG: CPT | Performed by: NURSE PRACTITIONER

## 2024-10-16 PROCEDURE — 93000 ELECTROCARDIOGRAM COMPLETE: CPT | Performed by: NURSE PRACTITIONER

## 2024-10-16 PROCEDURE — 3008F BODY MASS INDEX DOCD: CPT | Performed by: NURSE PRACTITIONER

## 2024-10-16 PROCEDURE — 1159F MED LIST DOCD IN RCRD: CPT | Performed by: NURSE PRACTITIONER

## 2024-10-16 RX ORDER — FERROUS SULFATE 325(65) MG
325 TABLET ORAL
COMMUNITY

## 2024-10-16 RX ORDER — LANOLIN ALCOHOL/MO/W.PET/CERES
1000 CREAM (GRAM) TOPICAL
COMMUNITY

## 2024-10-16 RX ORDER — CALCIUM CARBONATE/VITAMIN D3 500-10/5ML
1 LIQUID (ML) ORAL DAILY
COMMUNITY

## 2024-10-16 RX ORDER — RIBOFLAVIN (VITAMIN B2) 50 MG
50 TABLET ORAL DAILY
COMMUNITY

## 2024-10-16 RX ORDER — OMEPRAZOLE 40 MG/1
40 CAPSULE, DELAYED RELEASE ORAL
COMMUNITY
Start: 2024-10-15

## 2024-10-16 NOTE — PROGRESS NOTES
"Name : Anirudh Mobley   : 1955   MRN : [id number]   ENC Date : 10/16/2024    CC:   Elevated Calcium Score test and New Patient Visit     HPI:    Anirudh Mobley \"Roberth\" is a 69 y.o. male with PMHx sig for HTN, HLD, BPH, IBS s/p colon resection & GERD who presents today for cardiovascular evaluation after having an elevated CAC score.    Denies any chest pain, pressure, SOB/HERNADEZ, PND, orthopnea, LE edema, palpitations, lightheadedness, dizziness, or syncope.     Reports that he had an exercise stress test at the age of 38 that was \"precautionary\".    Exercise: primary method of exercise is walking, not in [place] 2/2 projects he's been doing in his house. But when in [place], walks a lot & course is hilly. Loses 16 lbs when he goes there.    Lives in [place] 6 months of the year.     Reports that most of the males in his family die between ages mid 30s & 5os.     CV Diagnostics:  EKG today shows Sinus Rhythm, no ischemic changes    CAC score 24:  LM 31.7,  .32,  LCx 56,  .24,  Total 315.26    ROS: unless otherwise noted in the history of present illness, all other systems were reviewed and they are negative for complaints     PMH:  As above    PSH:  He has a past surgical history that includes Colon surgery; Eye surgery; Tonsillectomy; Vasectomy; and [place] tooth extraction.  Colon resection (about 10 years ago)    SHx:  He reports that he has never smoked. He has been exposed to tobacco smoke. He has never used smokeless tobacco. He reports current alcohol use of about 21.0 standard drinks of alcohol per week. He reports that he does not use drugs.    FHx: as above     Allergies:  Lisinopril and Penicillins    Outpatient Medications:  Current Outpatient Medications   Medication Instructions    amLODIPine (NORVASC) 5 mg, oral, Daily    betamethasone valerate (Valisone) 0.1 % cream Apply topically.    cyanocobalamin (VITAMIN B-12) 1,000 mcg, Daily RT    ferrous sulfate (325 mg ferrous sulfate) " "325 mg, Daily RT    finasteride (PROSCAR) 5 mg, oral, Daily    FLUoxetine (PROZAC) 40 mg, oral, Daily    magnesium oxide 400 mg magnesium capsule 1 capsule, Daily    omeprazole (PRILOSEC) 40 mg, Daily RT    riboflavin (VITAMIN B2) 50 mg, Daily    rizatriptan MLT (MAXALT-MLT) 10 mg, oral, Once as needed    tacrolimus (Protopic) 0.1 % ointment Daily RT    traZODone (DESYREL) 100 mg, oral, Nightly    VITAMIN K2 ORAL 1 capsule, Daily     Last Recorded Vitals:  Vitals:    10/16/24 0823   BP: 136/74   BP Location: Left arm   Patient Position: Sitting   Pulse: 64   SpO2: 97%   Weight: 98.2 kg (216 lb 9.6 oz)   Height: 1.842 m (6' 0.5\")     Physical Exam:  On exam Mr. Anirudh Mobley appears his stated age, is alert and oriented x3, and in no acute distress. His sclera are anicteric and his oropharynx has moist mucous membranes. His neck is supple and without thyromegaly. The JVP is ~5 cm of water above the right atrium. His cardiac exam has regular rhythm, normal S1, S2. No S3/4. There are no murmurs. His lungs are clear to auscultation bilaterally and there is no dullness to percussion. His abdomen is soft, nontender with normoactive bowel sounds. There is no HJR. The extremities are warm and without edema. The skin is dry. There is no rash present. The distal pulses are 2-3+ in all four extremities. His mood and affect are appropriate for todays encounter.      Last Labs:  CBC -  Lab Results   Component Value Date    WBC 6.5 08/15/2024    HGB 16.2 08/15/2024    HCT 48.5 08/15/2024    MCV 90 08/15/2024     08/15/2024       CMP -  Lab Results   Component Value Date    CALCIUM 9.2 08/15/2024    PROT 6.5 08/15/2024    ALBUMIN 4.2 08/15/2024    AST 10 08/15/2024    ALT 10 08/15/2024    ALKPHOS 36 08/15/2024    BILITOT 0.9 08/15/2024       LIPID PANEL -   Lab Results   Component Value Date    CHOL 216 (H) 08/15/2024    TRIG 126 08/15/2024    HDL 56.9 08/15/2024    CHHDL 3.8 08/15/2024    LDLF 146 (H) 08/11/2023    VLDL " "25 08/15/2024    NHDL 159 (H) 08/15/2024       RENAL FUNCTION PANEL -   Lab Results   Component Value Date    GLUCOSE 105 (H) 08/15/2024     08/15/2024    K 4.0 08/15/2024     08/15/2024    CO2 30 08/15/2024    ANIONGAP 12 08/15/2024    BUN 14 08/15/2024    CREATININE 1.02 08/15/2024    GFRMALE 78 08/11/2023    CALCIUM 9.2 08/15/2024    ALBUMIN 4.2 08/15/2024        No results found for: \"BNP\", \"HGBA1C\"  I have reviewed the above labs & diagnostics    Assessment/Plan:  Elevated coronary artery calcium score. EKG without ischemic changes Relatively active with no anginal symptoms. Risk factors for CAD include age & HLD. Family history is concerning though. Will get exercise stress test for further evaluation.     Hyperlipidemia. Currently not on statin. Consider initiation.    Follow up after testing    Tracy M Schwab, APRN-CNP  "

## 2024-10-21 ENCOUNTER — HOSPITAL ENCOUNTER (OUTPATIENT)
Dept: CARDIOLOGY | Facility: HOSPITAL | Age: 69
Discharge: HOME | End: 2024-10-21
Payer: MEDICARE

## 2024-10-21 DIAGNOSIS — R93.1 ELEVATED CORONARY ARTERY CALCIUM SCORE: ICD-10-CM

## 2024-10-21 PROCEDURE — 93016 CV STRESS TEST SUPVJ ONLY: CPT | Performed by: INTERNAL MEDICINE

## 2024-10-21 PROCEDURE — 93018 CV STRESS TEST I&R ONLY: CPT | Performed by: INTERNAL MEDICINE

## 2024-10-21 PROCEDURE — 93017 CV STRESS TEST TRACING ONLY: CPT

## 2024-10-30 ENCOUNTER — APPOINTMENT (OUTPATIENT)
Dept: CARDIOLOGY | Facility: CLINIC | Age: 69
End: 2024-10-30
Payer: MEDICARE

## 2024-11-08 ENCOUNTER — APPOINTMENT (OUTPATIENT)
Dept: CARDIOLOGY | Facility: CLINIC | Age: 69
End: 2024-11-08
Payer: MEDICARE

## 2024-11-08 VITALS
DIASTOLIC BLOOD PRESSURE: 72 MMHG | SYSTOLIC BLOOD PRESSURE: 134 MMHG | HEART RATE: 64 BPM | BODY MASS INDEX: 27.91 KG/M2 | OXYGEN SATURATION: 97 % | HEIGHT: 73 IN | WEIGHT: 210.6 LBS

## 2024-11-08 DIAGNOSIS — R93.1 ELEVATED CORONARY ARTERY CALCIUM SCORE: Primary | ICD-10-CM

## 2024-11-08 DIAGNOSIS — I10 PRIMARY HYPERTENSION: ICD-10-CM

## 2024-11-08 DIAGNOSIS — E78.2 MIXED HYPERLIPIDEMIA: ICD-10-CM

## 2024-11-08 PROCEDURE — 99214 OFFICE O/P EST MOD 30 MIN: CPT | Performed by: NURSE PRACTITIONER

## 2024-11-08 PROCEDURE — G2211 COMPLEX E/M VISIT ADD ON: HCPCS | Performed by: NURSE PRACTITIONER

## 2024-11-08 PROCEDURE — 3075F SYST BP GE 130 - 139MM HG: CPT | Performed by: NURSE PRACTITIONER

## 2024-11-08 PROCEDURE — 3008F BODY MASS INDEX DOCD: CPT | Performed by: NURSE PRACTITIONER

## 2024-11-08 PROCEDURE — 1160F RVW MEDS BY RX/DR IN RCRD: CPT | Performed by: NURSE PRACTITIONER

## 2024-11-08 PROCEDURE — 1159F MED LIST DOCD IN RCRD: CPT | Performed by: NURSE PRACTITIONER

## 2024-11-08 PROCEDURE — 1036F TOBACCO NON-USER: CPT | Performed by: NURSE PRACTITIONER

## 2024-11-08 PROCEDURE — 1123F ACP DISCUSS/DSCN MKR DOCD: CPT | Performed by: NURSE PRACTITIONER

## 2024-11-08 PROCEDURE — 3078F DIAST BP <80 MM HG: CPT | Performed by: NURSE PRACTITIONER

## 2024-11-08 RX ORDER — AMLODIPINE BESYLATE 10 MG/1
10 TABLET ORAL DAILY
Qty: 90 TABLET | Refills: 3 | Status: SHIPPED | OUTPATIENT
Start: 2024-11-08 | End: 2025-11-08

## 2024-11-08 RX ORDER — ATORVASTATIN CALCIUM 40 MG/1
40 TABLET, FILM COATED ORAL DAILY
Qty: 90 TABLET | Refills: 3 | Status: SHIPPED | OUTPATIENT
Start: 2024-11-08 | End: 2025-11-08

## 2024-11-08 NOTE — PATIENT INSTRUCTIONS
- increase Amlodipine to 10mg once a day  - start Atorvastatin 40mg once a day at night  - Blood work in 2 weeks  - Follow up with me in 6 months    It was my pleasure to meet you. I look forward to being your cardiac Nurse Practitioner. I am a huge believer in communicating with my patients. Please contact me at any time, if anything is not clear to you regarding anything we have discussed, or if new questions occur to you.

## 2024-11-08 NOTE — PROGRESS NOTES
"Name : Anirudh Mobley   : 1955   MRN : [id number]   ENC Date : 2024    CC:   Follow-up      HPI:    Anirudh Mobley \"Roberth\" is a 69 y.o. male with PMHx sig for coronary artery calcification, HTN, HLD, BPH, IBS s/p colon resection & GERD who presents with his Wife today as above.    Denies any chest pain, pressure, SOB/HERNADEZ, PND, orthopnea, LE edema, palpitations, lightheadedness, dizziness, or syncope.     Reports that he had an exercise stress test at the age of 38 that was \"precautionary\".    Exercise: primary method of exercise is walking, not in [place] 2/2 projects he's been doing in his house. But when in [place], walks a lot & course is hilly. Loses 16 lbs when he goes there.    Lives in [place] 6 months of the year.     Reports that most of the males in his family die between ages mid 30s & 50s.     CV Diagnostics:  Exercise stress 10/21/24:   1. Average functional capacity for age (7.0 METS).   2. Hypertensive BP response to exercise.   3. No chest pain during stress or recovery.   4. No evidence of stress induced arrhythmias.   5. Adequate level of stress achieved.   6. No electrocardiographic evidence for ischemia at maximal workload.   7. Normal ECG.   8. Normal Stress Test.    EKG today shows Sinus Rhythm, no ischemic changes    CAC score 24:  LM 31.7,  .32,  LCx 56,  .24,  Total 315.26    ROS: unless otherwise noted in the history of present illness, all other systems were reviewed and they are negative for complaints     Allergies:  Lisinopril and Penicillins    Outpatient Medications:  Current Outpatient Medications   Medication Instructions    amLODIPine (NORVASC) 5 mg, oral, Daily    betamethasone valerate (Valisone) 0.1 % cream Apply topically.    cyanocobalamin (VITAMIN B-12) 1,000 mcg, Daily RT    ferrous sulfate (325 mg ferrous sulfate) 325 mg, Daily RT    finasteride (PROSCAR) 5 mg, oral, Daily    FLUoxetine (PROZAC) 40 mg, oral, Daily    magnesium oxide 400 mg " "magnesium capsule 1 capsule, Daily    omeprazole (PRILOSEC) 40 mg, Daily RT    riboflavin (VITAMIN B2) 50 mg, Daily    rizatriptan MLT (MAXALT-MLT) 10 mg, oral, Once as needed    tacrolimus (Protopic) 0.1 % ointment Daily RT    traZODone (DESYREL) 100 mg, oral, Nightly    VITAMIN K2 ORAL 1 capsule, Daily     Last Recorded Vitals:  Vitals:    11/08/24 1436   BP: 134/72   BP Location: Left arm   Patient Position: Sitting   Pulse: 64   SpO2: 97%   Weight: 95.5 kg (210 lb 9.6 oz)   Height: 1.842 m (6' 0.5\")     Physical Exam:  On exam Mr. Anirudh Mobley appears his stated age, is alert and oriented x3, and in no acute distress. His sclera are anicteric and his oropharynx has moist mucous membranes. His neck is supple and without thyromegaly. The JVP is ~5 cm of water above the right atrium. His cardiac exam has regular rhythm, normal S1, S2. No S3/4. There are no murmurs. His lungs are clear to auscultation bilaterally and there is no dullness to percussion. His abdomen is soft, nontender with normoactive bowel sounds. There is no HJR. The extremities are warm and without edema. The skin is dry. There is no rash present. The distal pulses are 2-3+ in all four extremities. His mood and affect are appropriate for todays encounter.      Last Labs:  CBC -  Lab Results   Component Value Date    WBC 6.5 08/15/2024    HGB 16.2 08/15/2024    HCT 48.5 08/15/2024    MCV 90 08/15/2024     08/15/2024       CMP -  Lab Results   Component Value Date    CALCIUM 9.2 08/15/2024    PROT 6.5 08/15/2024    ALBUMIN 4.2 08/15/2024    AST 10 08/15/2024    ALT 10 08/15/2024    ALKPHOS 36 08/15/2024    BILITOT 0.9 08/15/2024       LIPID PANEL -   Lab Results   Component Value Date    CHOL 216 (H) 08/15/2024    TRIG 126 08/15/2024    HDL 56.9 08/15/2024    CHHDL 3.8 08/15/2024    LDLF 146 (H) 08/11/2023    VLDL 25 08/15/2024    NHDL 159 (H) 08/15/2024       RENAL FUNCTION PANEL -   Lab Results   Component Value Date    GLUCOSE 105 (H) " "08/15/2024     08/15/2024    K 4.0 08/15/2024     08/15/2024    CO2 30 08/15/2024    ANIONGAP 12 08/15/2024    BUN 14 08/15/2024    CREATININE 1.02 08/15/2024    GFRMALE 78 08/11/2023    CALCIUM 9.2 08/15/2024    ALBUMIN 4.2 08/15/2024        No results found for: \"BNP\", \"HGBA1C\"  I have reviewed the above labs & diagnostics    Assessment/Plan:  Elevated coronary artery calcium score. Exercise stress test was normal. Given score & HLD, would start statin therapy    Hyperlipidemia. Start atorvastatin 40mg every day. Repeat lipid panel when I see him in 6 months    Hypertension. Relatively controlled. Would increase amlodipine to 10mg every day     CMP 2 weeks. Follow up with me in 6 months    Tracy M Schwab, APRN-CNP  " Home

## 2024-12-02 LAB
NON-UH HIE A/G RATIO: 1.6
NON-UH HIE ALB: 4.1 G/DL (ref 3.4–5)
NON-UH HIE ALK PHOS: 51 UNIT/L (ref 45–117)
NON-UH HIE BILIRUBIN, TOTAL: 1.2 MG/DL (ref 0.3–1.2)
NON-UH HIE BUN/CREAT RATIO: 13.6
NON-UH HIE BUN: 15 MG/DL (ref 9–23)
NON-UH HIE CALCIUM: 9.8 MG/DL (ref 8.7–10.4)
NON-UH HIE CALCULATED OSMOLALITY: 283 MOSM/KG (ref 275–295)
NON-UH HIE CHLORIDE: 104 MMOL/L (ref 98–107)
NON-UH HIE CO2, VENOUS: 30 MMOL/L (ref 20–31)
NON-UH HIE CREATININE: 1.1 MG/DL (ref 0.6–1.1)
NON-UH HIE GFR AA: >60
NON-UH HIE GLOBULIN: 2.6 G/DL
NON-UH HIE GLOMERULAR FILTRATION RATE: >60 ML/MIN/1.73M?
NON-UH HIE GLUCOSE: 118 MG/DL (ref 74–106)
NON-UH HIE GOT: 13 UNIT/L (ref 15–37)
NON-UH HIE GPT: 15 UNIT/L (ref 10–49)
NON-UH HIE K: 4 MMOL/L (ref 3.5–5.1)
NON-UH HIE NA: 141 MMOL/L (ref 135–145)
NON-UH HIE TOTAL PROTEIN: 6.7 G/DL (ref 5.7–8.2)

## 2025-04-22 PROBLEM — R63.5 ABNORMAL WEIGHT GAIN: Status: RESOLVED | Noted: 2023-07-12 | Resolved: 2025-04-22

## 2025-04-28 ENCOUNTER — APPOINTMENT (OUTPATIENT)
Facility: CLINIC | Age: 70
End: 2025-04-28
Payer: MEDICARE

## 2025-04-28 ENCOUNTER — APPOINTMENT (OUTPATIENT)
Dept: NEUROLOGY | Facility: CLINIC | Age: 70
End: 2025-04-28
Payer: MEDICARE

## 2025-04-29 ENCOUNTER — APPOINTMENT (OUTPATIENT)
Dept: PRIMARY CARE | Facility: CLINIC | Age: 70
End: 2025-04-29
Payer: MEDICARE

## 2025-05-06 ENCOUNTER — APPOINTMENT (OUTPATIENT)
Dept: CARDIOLOGY | Facility: CLINIC | Age: 70
End: 2025-05-06
Payer: MEDICARE

## 2025-05-06 VITALS
BODY MASS INDEX: 28.36 KG/M2 | HEIGHT: 73 IN | SYSTOLIC BLOOD PRESSURE: 132 MMHG | DIASTOLIC BLOOD PRESSURE: 84 MMHG | HEART RATE: 60 BPM | WEIGHT: 214 LBS

## 2025-05-06 DIAGNOSIS — R93.1 ELEVATED CORONARY ARTERY CALCIUM SCORE: Primary | ICD-10-CM

## 2025-05-06 DIAGNOSIS — I10 PRIMARY HYPERTENSION: ICD-10-CM

## 2025-05-06 DIAGNOSIS — E78.2 MIXED HYPERLIPIDEMIA: ICD-10-CM

## 2025-05-06 PROCEDURE — 99214 OFFICE O/P EST MOD 30 MIN: CPT | Performed by: NURSE PRACTITIONER

## 2025-05-06 PROCEDURE — 3079F DIAST BP 80-89 MM HG: CPT | Performed by: NURSE PRACTITIONER

## 2025-05-06 PROCEDURE — 3075F SYST BP GE 130 - 139MM HG: CPT | Performed by: NURSE PRACTITIONER

## 2025-05-06 PROCEDURE — 3008F BODY MASS INDEX DOCD: CPT | Performed by: NURSE PRACTITIONER

## 2025-05-06 PROCEDURE — 1160F RVW MEDS BY RX/DR IN RCRD: CPT | Performed by: NURSE PRACTITIONER

## 2025-05-06 PROCEDURE — 1159F MED LIST DOCD IN RCRD: CPT | Performed by: NURSE PRACTITIONER

## 2025-05-06 PROCEDURE — 1123F ACP DISCUSS/DSCN MKR DOCD: CPT | Performed by: NURSE PRACTITIONER

## 2025-05-06 PROCEDURE — 1036F TOBACCO NON-USER: CPT | Performed by: NURSE PRACTITIONER

## 2025-05-06 NOTE — LETTER
"May 6, 2025     Sandra Ramsey MD  [address]  [facility]  [facility] [address]    Patient: Roberth Mobley   YOB: 1955   Date of Visit: 2025       Dear Dr. Sandra Ramsey MD:    Thank you for referring Roberth Mobley to me for evaluation. Below are my notes for this consultation.  If you have questions, please do not hesitate to call me. I look forward to following your patient along with you.       Sincerely,     Tracy M Schwab, APRN-CNP      CC: No Recipients  ______________________________________________________________________________________    Name : Anirudh Mobley   : 1955   MRN : [id number]   ENC Date : 2025    CC:   Follow up HLD, elevated CAC     HPI:    Anirudh Mobley \"Roberth\" is a 70 y.o. male with PMHx sig for coronary artery calcification, HTN, HLD, BPH, IBS s/p colon resection & GERD who presents with his Wife today as above.    Denies any chest pain, pressure, SOB/HERNADEZ, PND, orthopnea, LE edema, palpitations, lightheadedness, dizziness, or syncope.     Exercise: primary method of exercise is walking, not in [place] 2/2 projects he's been doing in his house. But when in [place], walks a lot & course is hilly.     Lives in [place] 6 months of the year.     Reports that most of the males in his family die between ages mid 30s & 50s from CV issues.    OV 25: Roberth stopped atorvastatin in  myalgia, hives & LE edema. Did not notify me. Reports all symptoms resolved.      CV Diagnostics:  Exercise stress 10/21/24:   1. Average functional capacity for age (7.0 METS).   2. Hypertensive BP response to exercise.   3. No chest pain during stress or recovery.   4. No evidence of stress induced arrhythmias.   5. Adequate level of stress achieved.   6. No electrocardiographic evidence for ischemia at maximal workload.   7. Normal ECG.   8. Normal Stress Test.    EKG today shows Sinus Rhythm, no ischemic changes    CAC score 24:  LM " "31.7,  .32,  LCx 56,  .24,  Total 315.26    ROS: unless otherwise noted in the history of present illness, all other systems were reviewed and they are negative for complaints     Allergies:  Lisinopril and Penicillins    Outpatient Medications:  Current Outpatient Medications   Medication Instructions   • amLODIPine (NORVASC) 10 mg, oral, Daily   • atorvastatin (LIPITOR) 40 mg, oral, Daily   • betamethasone valerate (Valisone) 0.1 % cream Apply topically.   • cyanocobalamin (VITAMIN B-12) 1,000 mcg, Daily RT   • ferrous sulfate 325 mg, Daily RT   • finasteride (PROSCAR) 5 mg, oral, Daily   • FLUoxetine (PROZAC) 40 mg, oral, Daily   • magnesium oxide 400 mg magnesium capsule 1 capsule, Daily   • omeprazole (PRILOSEC) 40 mg, Daily RT   • riboflavin (VITAMIN B2) 50 mg, Daily   • rizatriptan MLT (MAXALT-MLT) 10 mg, oral, Once as needed   • tacrolimus (Protopic) 0.1 % ointment Daily RT   • traZODone (DESYREL) 100 mg, oral, Nightly   • VITAMIN K2 ORAL 1 capsule, Daily     Last Recorded Vitals:  Vitals:    05/06/25 1103   BP: 132/84   Pulse: 60   Weight: 97.1 kg (214 lb)   Height: 1.854 m (6' 1\")     Physical Exam:  On exam Mr. Anirudh Mobley appears his stated age, is alert and oriented x3, and in no acute distress. His sclera are anicteric and his oropharynx has moist mucous membranes. His neck is supple and without thyromegaly. The JVP is ~5 cm of water above the right atrium. His cardiac exam has regular rhythm, normal S1, S2. No S3/4. There are no murmurs. His lungs are clear to auscultation bilaterally and there is no dullness to percussion. His abdomen is soft, nontender with normoactive bowel sounds. There is no HJR. The extremities are warm and without edema. The skin is dry. There is no rash present. The distal pulses are 2-3+ in all four extremities. His mood and affect are appropriate for todays encounter.      Last Labs:  CBC -  Lab Results   Component Value Date    WBC 6.5 08/15/2024    HGB " "16.2 08/15/2024    HCT 48.5 08/15/2024    MCV 90 08/15/2024     08/15/2024       CMP -  Lab Results   Component Value Date    CALCIUM 9.2 08/15/2024    PROT 6.5 08/15/2024    ALBUMIN 4.2 08/15/2024    AST 10 08/15/2024    ALT 10 08/15/2024    ALKPHOS 36 08/15/2024    BILITOT 0.9 08/15/2024       LIPID PANEL -   Lab Results   Component Value Date    CHOL 216 (H) 08/15/2024    TRIG 126 08/15/2024    HDL 56.9 08/15/2024    CHHDL 3.8 08/15/2024    LDLF 146 (H) 08/11/2023    VLDL 25 08/15/2024    NHDL 159 (H) 08/15/2024       RENAL FUNCTION PANEL -   Lab Results   Component Value Date    GLUCOSE 105 (H) 08/15/2024     08/15/2024    K 4.0 08/15/2024     08/15/2024    CO2 30 08/15/2024    ANIONGAP 12 08/15/2024    BUN 14 08/15/2024    CREATININE 1.02 08/15/2024    GFRMALE 78 08/11/2023    CALCIUM 9.2 08/15/2024    ALBUMIN 4.2 08/15/2024        No results found for: \"BNP\", \"HGBA1C\"  I have reviewed the above labs & diagnostics    Assessment/Plan:  Elevated coronary artery calcium score. Exercise stress test was normal.   Roberth stopped atorvastatin in February 2/2 myalgia, hives & LE edema. Did not notify me. Reports all symptoms resolved.  Not interested in trying alternative. Discussed diet modifications & Roberth says he's doing most of it, though as we are talking & he's describing what he is eating - he's not.    Hyperlipidemia. As above.    Hypertension. Primarily eats at home. No added salt. But gets mediterranean bowls fromDiscussed mediterranean diet as well as 2g sodium restriction. [employer] & will occasionally get [employer]. C/w amlodipine 10mg every day    I spent 45 mins counseling Roberth on CVD, effect of diet on CVD & how to alter diet, as well as discussion about lipid control.     Advised annual follow up for clinical surveillance. Roberth is not interested in following regular with Cardiology. Defer further anti-htn & hyperlipidemia management to his PCP.    Tracy M Schwab, APRN-CNP  "

## 2025-05-06 NOTE — PROGRESS NOTES
"Name : Anirudh Mobley   : 1955   MRN : [id number]   ENC Date : 2025    CC:   Follow up HLD, elevated CAC     HPI:    Anirudh Mobley \"Roberth\" is a 70 y.o. male with PMHx sig for coronary artery calcification, HTN, HLD, BPH, IBS s/p colon resection & GERD who presents with his Wife today as above.    Denies any chest pain, pressure, SOB/HERNADEZ, PND, orthopnea, LE edema, palpitations, lightheadedness, dizziness, or syncope.     Exercise: primary method of exercise is walking, not in [place]  projects he's been doing in his house. But when in [place], walks a lot & course is hilly.     Lives in [place] 6 months of the year.     Reports that most of the males in his family die between ages mid 30s & 50s from CV issues.    OV 25: Roberth stopped atorvastatin in  myalgia, hives & LE edema. Did not notify me. Reports all symptoms resolved.      CV Diagnostics:  Exercise stress 10/21/24:   1. Average functional capacity for age (7.0 METS).   2. Hypertensive BP response to exercise.   3. No chest pain during stress or recovery.   4. No evidence of stress induced arrhythmias.   5. Adequate level of stress achieved.   6. No electrocardiographic evidence for ischemia at maximal workload.   7. Normal ECG.   8. Normal Stress Test.    EKG today shows Sinus Rhythm, no ischemic changes    CAC score 24:  LM 31.7,  .32,  LCx 56,  .24,  Total 315.26    ROS: unless otherwise noted in the history of present illness, all other systems were reviewed and they are negative for complaints     Allergies:  Lisinopril and Penicillins    Outpatient Medications:  Current Outpatient Medications   Medication Instructions    amLODIPine (NORVASC) 10 mg, oral, Daily    atorvastatin (LIPITOR) 40 mg, oral, Daily    betamethasone valerate (Valisone) 0.1 % cream Apply topically.    cyanocobalamin (VITAMIN B-12) 1,000 mcg, Daily RT    ferrous sulfate 325 mg, Daily RT    finasteride (PROSCAR) 5 mg, oral, Daily    " "FLUoxetine (PROZAC) 40 mg, oral, Daily    magnesium oxide 400 mg magnesium capsule 1 capsule, Daily    omeprazole (PRILOSEC) 40 mg, Daily RT    riboflavin (VITAMIN B2) 50 mg, Daily    rizatriptan MLT (MAXALT-MLT) 10 mg, oral, Once as needed    tacrolimus (Protopic) 0.1 % ointment Daily RT    traZODone (DESYREL) 100 mg, oral, Nightly    VITAMIN K2 ORAL 1 capsule, Daily     Last Recorded Vitals:  Vitals:    05/06/25 1103   BP: 132/84   Pulse: 60   Weight: 97.1 kg (214 lb)   Height: 1.854 m (6' 1\")     Physical Exam:  On exam Mr. Anirudh Mobley appears his stated age, is alert and oriented x3, and in no acute distress. His sclera are anicteric and his oropharynx has moist mucous membranes. His neck is supple and without thyromegaly. The JVP is ~5 cm of water above the right atrium. His cardiac exam has regular rhythm, normal S1, S2. No S3/4. There are no murmurs. His lungs are clear to auscultation bilaterally and there is no dullness to percussion. His abdomen is soft, nontender with normoactive bowel sounds. There is no HJR. The extremities are warm and without edema. The skin is dry. There is no rash present. The distal pulses are 2-3+ in all four extremities. His mood and affect are appropriate for todays encounter.      Last Labs:  CBC -  Lab Results   Component Value Date    WBC 6.5 08/15/2024    HGB 16.2 08/15/2024    HCT 48.5 08/15/2024    MCV 90 08/15/2024     08/15/2024       CMP -  Lab Results   Component Value Date    CALCIUM 9.2 08/15/2024    PROT 6.5 08/15/2024    ALBUMIN 4.2 08/15/2024    AST 10 08/15/2024    ALT 10 08/15/2024    ALKPHOS 36 08/15/2024    BILITOT 0.9 08/15/2024       LIPID PANEL -   Lab Results   Component Value Date    CHOL 216 (H) 08/15/2024    TRIG 126 08/15/2024    HDL 56.9 08/15/2024    CHHDL 3.8 08/15/2024    LDLF 146 (H) 08/11/2023    VLDL 25 08/15/2024    NHDL 159 (H) 08/15/2024       RENAL FUNCTION PANEL -   Lab Results   Component Value Date    GLUCOSE 105 (H) " "08/15/2024     08/15/2024    K 4.0 08/15/2024     08/15/2024    CO2 30 08/15/2024    ANIONGAP 12 08/15/2024    BUN 14 08/15/2024    CREATININE 1.02 08/15/2024    GFRMALE 78 08/11/2023    CALCIUM 9.2 08/15/2024    ALBUMIN 4.2 08/15/2024        No results found for: \"BNP\", \"HGBA1C\"  I have reviewed the above labs & diagnostics    Assessment/Plan:  Elevated coronary artery calcium score. Exercise stress test was normal.   Roberth stopped atorvastatin in February 2/2 myalgia, hives & LE edema. Did not notify me. Reports all symptoms resolved.  Not interested in trying alternative. Discussed diet modifications & Roberth says he's doing most of it, though as we are talking & he's describing what he is eating - he's not.    Hyperlipidemia. As above.    Hypertension. Primarily eats at home. No added salt. But gets mediterranean bowls fromDiscussed mediterranean diet as well as 2g sodium restriction. [employer] & will occasionally get [employer]. C/w amlodipine 10mg every day    I spent 45 mins counseling Roberth on CVD, effect of diet on CVD & how to alter diet, as well as discussion about lipid control.     Advised annual follow up for clinical surveillance. Roberth is not interested in following regular with Cardiology. Defer further anti-htn & hyperlipidemia management to his PCP.    Tracy M Schwab, APRN-CNP  "

## 2025-05-17 PROBLEM — L30.9 DERMATITIS: Status: RESOLVED | Noted: 2023-07-12 | Resolved: 2025-05-17

## 2025-05-17 PROBLEM — F41.9 ANXIETY: Status: RESOLVED | Noted: 2023-07-12 | Resolved: 2025-05-17

## 2025-05-17 PROBLEM — J32.9 SINUSITIS: Status: RESOLVED | Noted: 2023-07-12 | Resolved: 2025-05-17

## 2025-05-17 PROBLEM — K62.5 RECTAL BLEED: Status: RESOLVED | Noted: 2023-07-12 | Resolved: 2025-05-17

## 2025-05-17 PROBLEM — R53.83 FATIGUE: Status: RESOLVED | Noted: 2023-07-12 | Resolved: 2025-05-17

## 2025-05-18 NOTE — PROGRESS NOTES
"  Subjective   Patient ID:   [id number]   Anirudh Mobley \"Roberth\" is a 70 y.o. male who presents for Allergies.    Chief Complaint   Patient presents with    Allergies      This is a new patient, with H/O HTN, BPH, reflux, referred by Sandra Ramsey MD, PCP, for evaluation of allergy symptoms, penicillin testing and rash.    Patient reports he was allergy tested 40 years ago and reacted to trees and dust when he was living in [place].  He had shots in the past but they were DCd due to something in the ingredients.  He splits residence between [place] and [place] (typically Nov through May, but can change).  [place] but has progressively worsening congestion, migraines and rhinorrhea.  He takes Nasacort and Zyrtec one time daily but stopped it 6 days ago for this appointment.  He denies exposure to rodents or farm animals.    Patient is also C/O a recurrent rash and \"hives\" x6 months that waxes and wanes.  He goes \"crazy\" for 3 months and states his inner ear itching can be severe.  The rashes are always waist down, they are rough, itchy and at one time, he had lumps on his shins.  He has tried Benadryl and triamcinolone and has seen Evangelista Gonzalez MD, Dermatologist, in [place].       Patient denies SOB and performs activity with no issues.  He has EoE (based on his diagnosis in old records) and heartburn for which he takes Prilosec.  His heartburn is controlled, but he has persistent dysphagia.  He reports chronic back pain at baseline.    Patient states at age 3 or 4, he had throat tightening after having penicillin.  He is requesting testing.    Patient's wife was a teacher.      Review of Systems   HENT:  Positive for trouble swallowing.    Respiratory:  Negative for shortness of breath and stridor.    Gastrointestinal:         Heartburn, controlled.   Musculoskeletal:  Positive for arthralgias and back pain.   Skin:  Positive for rash (Recurrent, bilateral lower extremities).        " "Generalized itching, severe in ears bilaterally.     Objective   Ht 1.854 m (6' 1\")   Wt 97.1 kg (214 lb)   BMI 28.23 kg/m²      Physical Exam  Constitutional:       Appearance: Normal appearance.   HENT:      Head: Normocephalic and atraumatic.      Right Ear: External ear normal. There is no impacted cerumen.      Left Ear: External ear normal. There is no impacted cerumen.      Nose: No congestion or rhinorrhea.   Eyes:      Extraocular Movements: Extraocular movements intact.      Conjunctiva/sclera: Conjunctivae normal.      Pupils: Pupils are equal, round, and reactive to light.   Cardiovascular:      Rate and Rhythm: Normal rate and regular rhythm.      Heart sounds: No murmur heard.     No friction rub. No gallop.   Pulmonary:      Effort: No respiratory distress.      Breath sounds: No wheezing, rhonchi or rales.   Skin:     General: Skin is warm and dry.      Findings: Rash (dermatitis on bilateral lower extremities) present.   Neurological:      Mental Status: He is alert.   Psychiatric:         Mood and Affect: Mood normal.         Behavior: Behavior normal.     Allergy testing was performed on Anirudh Mobley \"Roberth\" using standard technique. There were no immediate complications.    Test Results  Panel 1  1.   Histamine: 5 x 5  2.   Saline - Diluent: 0  3.   Cockroach: 0  4.   Cotton Linters: 0  5.   Cat: 0  6.   Do  7.   D. Farinae: 0  8.   D. Pter: 0  9.   Feather: 0  10. Alternaria: 0  Tree Panel  1.   Pepe: 0  2.   Beech: 0  3.   Birch: 0  4.   [place]: 0  5.   Silver Maple: 0  6.   Hickory: 0  7.   Maple: 0  8.   Oak: 0  9.   [place]: 0  10. [place]: 0  Grass/Misc Tree  1.   Bermuda: 0  2.   [place] Blue: 0  3.   Audie: 0  4.   Derrell: 0  5.   Orchard: 0  6.   Red Top: 0  7.   Rye Grass: 0  8.   Sweet Vernal: 0  9.   Black [place]: 0  10. [place]elo: 0  Weeds  1.   Cocklebur: 0  2.   Common Mugwort: 0  3.   English Plantain: 0  4.   Hemp: 0  5.   Goldenrod: 0  6.   Kochia: 0  7.   Lamb's " Quarter: 0  8.   Salmon Elder: 0  9.   Pigweed: 0  10. Ragweed: 0  Molds  1.   Aspergillus Niger: 0  2.   Aureobasid: 0  3.   Bipolaris: 0  4.   Cladosporidium: 0  5.   Epicoccum: 0  6.   Fusarium: 0  7.   Geotrichum: 0  8.   Helminthosporium: 0  9.   Penicillium: 0  10. Phoma: 0  Animal  1.   Cow: 0  2.   Gerbil: 0  3.   Goat: 0  4.   Gu[place] Pi  5.   Hamster: 0  6.   Horse: 0  7.   Mosquito: 0  8.   Mouse: 0  9.   Rabbit: 0  10. Rat: 0    Intradermal allergy testing was performed on [facility] using standard technique. There were no immediate complications.    Test Results  Controls  Intradermal Saline: 0  ID  Cat: 0  Cockroach: 0  Common Weed Mix: 0  Cotton: 0  Do  Feather: 0  KORT Mix: 0  Mite Mix: 0  Mold Mix #1: 0  Mold Mix #2: 0  Ragweed: 0  Tree Mix: 0    Skin testing is negative.      **There was a direct correlation between the urticaria and syncope and the drug.   Presumed allergen cannot be easily or safely avoided.  Drug/Vaccine allergy testing was performed on [facility] using standard technique. There were no immediate complications.    Test Results  Controls  Positive Histamine:  (intradermal histamine 10 x 10)  Comments  Comments: Oral challenge needed.  ANTIGEN 1  Drug/Vaccine Name: Penicillin  Concentration/Solution: 10,000 U  Result: Negative    Skin testing to penicillin is negative.      Reviewed GI records, labs  Requested records from derm    Assessment/Plan     Throat swelling  At age 3 or 4, he had throat tightening after having penicillin.    Skin testing to penicillin is negative.  He will avoid until oral challenge to amoxicillin is completed.    Atopic dermatitis  He has a H/O dermatitic, itchy rash always below the waist that waxes and wanes.  It is rough, itchy and can be accompanied by lumps on his bilateral shins.  He has seen a dermatologist in [place] and uses Benadryl and triamcinolone. He has symptoms sporadically around his eyes. Currently his bilateral legs are  affected.     Approximately 36% of his body is affected    Dupixent is indicated in children down to 6 months of age.    DUPIXENT 300 mg Q2W    versus          Placebo    Injection site reactions  144 (18%)                                                50 (6%)    Oropharyngeal pain (sore throat)   19 (2%)                                                      7 (1%)    Eosinophilia  16 (2%)                                                     2 (<1%)    This may also help his EoE        By signing my name below, I, Ashley Tyson, attest that this documentation has been prepared under the direction and in the presence of Abbie Hines MD.  All medical record entries made by the Scribe were at my direction and personally dictated by me. I have reviewed the chart and agree that the record accurately reflects my personal performance of the history, physical exam, discussion and plan.

## 2025-05-19 ENCOUNTER — APPOINTMENT (OUTPATIENT)
Dept: ALLERGY | Facility: CLINIC | Age: 70
End: 2025-05-19
Payer: MEDICARE

## 2025-05-19 VITALS — HEIGHT: 73 IN | BODY MASS INDEX: 28.36 KG/M2 | WEIGHT: 214 LBS

## 2025-05-19 DIAGNOSIS — L20.9 ATOPIC DERMATITIS, UNSPECIFIED TYPE: ICD-10-CM

## 2025-05-19 DIAGNOSIS — J30.9 ALLERGIC RHINITIS, UNSPECIFIED SEASONALITY, UNSPECIFIED TRIGGER: ICD-10-CM

## 2025-05-19 DIAGNOSIS — L20.89 OTHER ATOPIC DERMATITIS: ICD-10-CM

## 2025-05-19 DIAGNOSIS — Z88.0 PENICILLIN ALLERGY: ICD-10-CM

## 2025-05-19 DIAGNOSIS — R09.89 THROAT TIGHTNESS: ICD-10-CM

## 2025-05-19 DIAGNOSIS — R09.81 NASAL CONGESTION: ICD-10-CM

## 2025-05-19 DIAGNOSIS — R22.1 THROAT SWELLING: Primary | ICD-10-CM

## 2025-05-19 PROCEDURE — 95024 IQ TESTS W/ALLERGENIC XTRCS: CPT | Performed by: ALLERGY & IMMUNOLOGY

## 2025-05-19 PROCEDURE — 99204 OFFICE O/P NEW MOD 45 MIN: CPT | Performed by: ALLERGY & IMMUNOLOGY

## 2025-05-19 PROCEDURE — 95018 ALL TSTG PERQ&IQ DRUGS/BIOL: CPT | Performed by: ALLERGY & IMMUNOLOGY

## 2025-05-19 PROCEDURE — 95004 PERQ TESTS W/ALRGNC XTRCS: CPT | Performed by: ALLERGY & IMMUNOLOGY

## 2025-05-19 ASSESSMENT — ENCOUNTER SYMPTOMS
SHORTNESS OF BREATH: 0
STRIDOR: 0
ARTHRALGIAS: 1
BACK PAIN: 1
TROUBLE SWALLOWING: 1

## 2025-05-19 NOTE — LETTER
"May 19, 2025     Sandra Ramsey MD  [address]  [facility], [address]  [place] [address]    Patient: Roberth Mobley   YOB: 1955   Date of Visit: 5/19/2025       Dear Dr. Sandra Ramsey MD:    Thank you for referring Roberth Mobley to me for evaluation. Below are my notes for this consultation.  If you have questions, please do not hesitate to call me. I look forward to following your patient along with you.       Sincerely,     Abbie Hines MD      CC: No Recipients  ______________________________________________________________________________________      Subjective  Patient ID:   [id number]   Anirudh Mobley \"Roberth\" is a 70 y.o. male who presents for Allergies.    Chief Complaint   Patient presents with   • Allergies      This is a new patient, with H/O HTN, BPH, reflux, referred by Sandra Ramsey MD, PCP, for evaluation of allergy symptoms, penicillin testing and rash.    Patient reports he was allergy tested 40 years ago and reacted to trees and dust when he was living in [place].  He had shots in the past but they were DCd due to something in the ingredients.  He splits residence between [place] and [place] (typically Nov through May, but can change).  [place] but has progressively worsening congestion, migraines and rhinorrhea.  He takes Nasacort and Zyrtec one time daily but stopped it 6 days ago for this appointment.  He denies exposure to rodents or farm animals.    Patient is also C/O a recurrent rash and \"hives\" x6 months that waxes and wanes.  He goes \"crazy\" for 3 months and states his inner ear itching can be severe.  The rashes are always waist down, they are rough, itchy and at one time, he had lumps on his shins.  He has tried Benadryl and triamcinolone and has seen Evangelista Gonzalez MD, Dermatologist, in [place].       Patient denies SOB and performs activity with no issues.  He has EoE (based on his diagnosis in old records) " "and heartburn for which he takes Prilosec.  His heartburn is controlled, but he has persistent dysphagia.  He reports chronic back pain at baseline.    Patient states at age 3 or 4, he had throat tightening after having penicillin.  He is requesting testing.    Patient's wife was a teacher.      Review of Systems   HENT:  Positive for trouble swallowing.    Respiratory:  Negative for shortness of breath and stridor.    Gastrointestinal:         Heartburn, controlled.   Musculoskeletal:  Positive for arthralgias and back pain.   Skin:  Positive for rash (Recurrent, bilateral lower extremities).        Generalized itching, severe in ears bilaterally.     Objective  Ht 1.854 m (6' 1\")   Wt 97.1 kg (214 lb)   BMI 28.23 kg/m²      Physical Exam  Constitutional:       Appearance: Normal appearance.   HENT:      Head: Normocephalic and atraumatic.      Right Ear: External ear normal. There is no impacted cerumen.      Left Ear: External ear normal. There is no impacted cerumen.      Nose: No congestion or rhinorrhea.   Eyes:      Extraocular Movements: Extraocular movements intact.      Conjunctiva/sclera: Conjunctivae normal.      Pupils: Pupils are equal, round, and reactive to light.   Cardiovascular:      Rate and Rhythm: Normal rate and regular rhythm.      Heart sounds: No murmur heard.     No friction rub. No gallop.   Pulmonary:      Effort: No respiratory distress.      Breath sounds: No wheezing, rhonchi or rales.   Skin:     General: Skin is warm and dry.      Findings: Rash (dermatitis on bilateral lower extremities) present.   Neurological:      Mental Status: He is alert.   Psychiatric:         Mood and Affect: Mood normal.         Behavior: Behavior normal.     Allergy testing was performed on Anirudh Mobley \"Roberth\" using standard technique. There were no immediate complications.    Test Results  Panel 1  1.   Histamine: 5 x 5  2.   Saline - Diluent: 0  3.   Cockroach: 0  4.   Cotton Linters: 0  5.   Cat: " 0  6.   Do  7.   D. Farinae: 0  8.   D. Pter: 0  9.   Feather: 0  10. Alternaria: 0  Tree Panel  1.   Pepe: 0  2.   Beech: 0  3.   Birch: 0  4.   Green[place]: 0  5.   Silver Maple: 0  6.   Hickory: 0  7.   Maple: 0  8.   Oak: 0  9.   [place]: 0  10. [place]: 0  Grass/Misc Tree  1.   Bermuda: 0  2.   [place] Blue: 0  3.   Audie: 0  4.   Derrell: 0  5.   Orchard: 0  6.   Red Top: 0  7.   Rye Grass: 0  8.   Sweet Vernal: 0  9.   Black [place]: 0  10. [place]: 0  Weeds  1.   Cocklebur: 0  2.   Common Mugwort: 0  3.   English Plantain: 0  4.   Hemp: 0  5.   Goldenrod: 0  6.   Kochia: 0  7.   Lamb's Quarter: 0  8.   Salmon Elder: 0  9.   Pigweed: 0  10. Ragweed: 0  Molds  1.   Aspergillus Niger: 0  2.   Aureobasid: 0  3.   Bipolaris: 0  4.   Cladosporidium: 0  5.   Epicoccum: 0  6.   Fusarium: 0  7.   Geotrichum: 0  8.   Helminthosporium: 0  9.   Penicillium: 0  10. Phoma: 0  Animal  1.   Cow: 0  2.   Gerbil: 0  3.   Goat: 0  4.   [place] Pi  5.   Hamster: 0  6.   Horse: 0  7.   Mosquito: 0  8.   Mouse: 0  9.   Rabbit: 0  10. Rat: 0    Intradermal allergy testing was performed on [facility] using standard technique. There were no immediate complications.    Test Results  Controls  Intradermal Saline: 0  ID  Cat: 0  Cockroach: 0  Common Weed Mix: 0  Cotton: 0  Do  Feather: 0  KORT Mix: 0  Mite Mix: 0  Mold Mix #1: 0  Mold Mix #2: 0  Ragweed: 0  Tree Mix: 0    Skin testing is negative.      **There was a direct correlation between the urticaria and syncope and the drug.   Presumed allergen cannot be easily or safely avoided.  Drug/Vaccine allergy testing was performed on [facility] using standard technique. There were no immediate complications.    Test Results  Controls  Positive Histamine:  (intradermal histamine 10 x 10)  Comments  Comments: Oral challenge needed.  ANTIGEN 1  Drug/Vaccine Name: Penicillin  Concentration/Solution: 10,000 U  Result: Negative    Skin testing to penicillin is negative.       Reviewed GI records, labs  Requested records from derm    Assessment/Plan    Throat swelling  At age 3 or 4, he had throat tightening after having penicillin.    Skin testing to penicillin is negative.  He will avoid until oral challenge to amoxicillin is completed.    Atopic dermatitis  He has a H/O dermatitic, itchy rash always below the waist that waxes and wanes.  It is rough, itchy and can be accompanied by lumps on his bilateral shins.  He has seen a dermatologist in [place] and uses Benadryl and triamcinolone. He has symptoms sporadically around his eyes. Currently his bilateral legs are affected.     Approximately 36% of his body is affected    Dupixent is indicated in children down to 6 months of age.    DUPIXENT 300 mg Q2W    versus          Placebo    Injection site reactions  144 (18%)                                                50 (6%)    Oropharyngeal pain (sore throat)   19 (2%)                                                      7 (1%)    Eosinophilia  16 (2%)                                                     2 (<1%)    This may also help his EoE        By signing my name below, I, Ashley Tyson, attest that this documentation has been prepared under the direction and in the presence of Abbie Hines MD.  All medical record entries made by the Scribe were at my direction and personally dictated by me. I have reviewed the chart and agree that the record accurately reflects my personal performance of the history, physical exam, discussion and plan.

## 2025-05-19 NOTE — ASSESSMENT & PLAN NOTE
At age 3 or 4, he had throat tightening after having penicillin.    Skin testing to penicillin is negative.  He will avoid until oral challenge to amoxicillin is completed.

## 2025-05-19 NOTE — ASSESSMENT & PLAN NOTE
He has a H/O dermatitic, itchy rash always below the waist that waxes and wanes.  It is rough, itchy and can be accompanied by lumps on his bilateral shins.  He has seen a dermatologist in [place] and uses Benadryl and triamcinolone. He has symptoms sporadically around his eyes. Currently his bilateral legs are affected.     Approximately 36% of his body is affected    Dupixent is indicated in children down to 6 months of age.    DUPIXENT 300 mg Q2W    versus          Placebo    Injection site reactions  144 (18%)                                                50 (6%)    Oropharyngeal pain (sore throat)   19 (2%)                                                      7 (1%)    Eosinophilia  16 (2%)                                                     2 (<1%)    This may also help his EoE

## 2025-05-19 NOTE — PATIENT INSTRUCTIONS
Skin testing is negative.      Skin testing to penicillin is negative.  Avoid until you return for oral challenge to amoxicillin.  Be sure to avoid all antihistamines 5 days before testing.  Testing will take approximately 2-3 hours.       For dermatitis, start Dupixent injection every other week.  This will be delivered to your home from a specialty pharmacy.  We will contact you to confirm delivery once we obtain authorization.  If you do not hear from them in the next two weeks, please contact our office.      Release of Records filled out for Dermatology.    Return 3 to 4 months after starting Dupixent for recheck or sooner if symptoms worsen prior.

## 2025-05-20 ENCOUNTER — SPECIALTY PHARMACY (OUTPATIENT)
Dept: PHARMACY | Facility: CLINIC | Age: 70
End: 2025-05-20

## 2025-05-28 ENCOUNTER — SPECIALTY PHARMACY (OUTPATIENT)
Dept: PHARMACY | Facility: CLINIC | Age: 70
End: 2025-05-28

## 2025-06-06 PROCEDURE — RXMED WILLOW AMBULATORY MEDICATION CHARGE

## 2025-06-12 ENCOUNTER — SPECIALTY PHARMACY (OUTPATIENT)
Dept: PHARMACY | Facility: CLINIC | Age: 70
End: 2025-06-12

## 2025-06-14 ENCOUNTER — PHARMACY VISIT (OUTPATIENT)
Dept: PHARMACY | Facility: CLINIC | Age: 70
End: 2025-06-14
Payer: COMMERCIAL

## 2025-06-17 ENCOUNTER — SPECIALTY PHARMACY (OUTPATIENT)
Dept: PHARMACY | Facility: CLINIC | Age: 70
End: 2025-06-17

## 2025-06-17 ENCOUNTER — APPOINTMENT (OUTPATIENT)
Dept: ALLERGY | Facility: CLINIC | Age: 70
End: 2025-06-17
Payer: MEDICARE

## 2025-06-17 NOTE — PROGRESS NOTES
"[facility] Clinical Note  Initial Patient Education     Introduction  Anirudh Mobley \"Roberth\" is a 70 y.o. male who is on the specialty pharmacy service for management of: Immunology Core.    Anirudh Mobley \"Roberth\" is initiating the following therapy: Dupixent - Inject 2 syringes (600mg) initially for loading dose then inject 1 syringe (300mg) every 2 weeks thereafter      Medication receipt date: 6/17/2025    Duration of therapy: Maintenance    The most recent encounter visit with the referring prescriber Abbie Hines MD on 5/19/2025 was reviewed. Pharmacy will continue to collaborate in the care of this patient with the referring prescriber.    Clinical Background  An initial assessment was conducted prior to first fill of the medication to determine the appropriateness of therapy given the patient's diagnosis, medication list, comorbidities, allergies, medical history, patient's ability to self administer medication, and therapeutic goals based on possible outcomes of therapy. Refer to initial assessment task completed on 5/22/2025.    Labs for clinical appropriateness that were reviewed include: N/A    Education/Discussion  Anirudh was contacted on 6/17/2025 at 11:30 AM for a pharmacy visit with encounter number [id number] from:   [facility]  [address] [place] [id number]  Dept: [phone]  Dept Fax: [phone]  Spouse consented to a Telephone visit, which was performed.    Medication Start Date (planned or actual): Appointment 6/18/2025  Education was conducted prior to start of therapy? Yes    Education discussed includes the following:  Patient Education  Counseled the Patient on the Following : Theraputic rationale and expected outcomes, Expected duration of therapy, Doses and administration, Adherence and missed doses, Possible side effects and management, Associated vaccinations, Cost of medications, Pharmacy contact information, " "Lab monitoring and follow-up, Safe handling, storage, and disposal, Contraindications and precautions  Learner: Significant other  Education Method: Explanation  Education Response: Verbalizes understanding  Additional details of the medication specific counseling are found within the linked patient education flowsheet.     The follow up timeline was discussed. Every person responds to and reacts to therapy differently. Patient should be assessed for efficacy and tolerability in approximately: 3 months    Provided education on goals and possible outcomes of therapy:  Adherence with therapy  Timely completion of appropriate labs  Timely and appropriate follow up with provider  Identify and address medication interactions with presciption medications, OTC medications and supplements  Optimize or maintain quality of life  Dermatology: Prevent or reduce disease flares  Reduce use of topical agents (corticosteroids or other \"prn\" agents)  Reduce pain, itchiness, inflammation and body surface area affected by atopic dermatitis    The importance of adherence was discussed and they were advised to take the medication as prescribed by their provider.     Impression/Plan  Review and Assessment   Reviewed During This Encounter: Medications, Problem list, Allergies  Medications Assessed for Appropriate Use, Dose, Route, Frequency, and Duration: Yes  Medication Reconciliation Completed: Yes (At DUR)  Drug Interactions Evaluated: Yes (At DUR)  Clinically Relevant Drug Interactions Identified: No    This patient has been identified as high risk due to Geriatric (over 65 years of age).  The following action was taken: Patient/caregiver encouraged to participate in patient management program.    QOL/Patient Satisfaction  Rate your quality of life on scale of 1-10: 5 (4-5 per wife, excited for him to start Dupixent)  Rate your satisfaction with  Specialty Pharmacy on scale of 1-10: 10 - Completely satisfied    The  Specialty " Pharmacy Welcome packet may be viewed here:   Specialty Pharmacy Welcome Packet     Or by scanning QR code:      Provided contact information ([phone]) for [facility] and reviewed dispensing process, refill timeline and patient management follow up. Advised to contact the pharmacy if there are any adverse effects and/or changes to medication list, including prescriptions, OTC medications, herbal products, or supplements. Confirmed understanding of education conducted during assessment. All questions and concerns were addressed and patient was encouraged to reach out for additional questions or concerns.    Roxy Castillo, PharmD

## 2025-06-18 ENCOUNTER — CLINICAL SUPPORT (OUTPATIENT)
Dept: ALLERGY | Facility: CLINIC | Age: 70
End: 2025-06-18
Payer: MEDICARE

## 2025-06-18 DIAGNOSIS — L20.89 OTHER ATOPIC DERMATITIS: Primary | ICD-10-CM

## 2025-06-18 PROCEDURE — 96372 THER/PROPH/DIAG INJ SC/IM: CPT | Performed by: ALLERGY & IMMUNOLOGY

## 2025-06-26 PROCEDURE — RXMED WILLOW AMBULATORY MEDICATION CHARGE

## 2025-06-28 ENCOUNTER — PHARMACY VISIT (OUTPATIENT)
Dept: PHARMACY | Facility: CLINIC | Age: 70
End: 2025-06-28
Payer: COMMERCIAL

## 2025-07-22 ENCOUNTER — SPECIALTY PHARMACY (OUTPATIENT)
Dept: PHARMACY | Facility: CLINIC | Age: 70
End: 2025-07-22

## 2025-07-22 PROCEDURE — RXMED WILLOW AMBULATORY MEDICATION CHARGE

## 2025-07-23 ENCOUNTER — PHARMACY VISIT (OUTPATIENT)
Dept: PHARMACY | Facility: CLINIC | Age: 70
End: 2025-07-23
Payer: COMMERCIAL

## 2025-08-13 ENCOUNTER — SPECIALTY PHARMACY (OUTPATIENT)
Dept: PHARMACY | Facility: CLINIC | Age: 70
End: 2025-08-13

## 2025-08-13 PROCEDURE — RXMED WILLOW AMBULATORY MEDICATION CHARGE

## 2025-08-14 ENCOUNTER — PHARMACY VISIT (OUTPATIENT)
Dept: PHARMACY | Facility: CLINIC | Age: 70
End: 2025-08-14
Payer: COMMERCIAL

## 2025-08-15 ENCOUNTER — APPOINTMENT (OUTPATIENT)
Dept: PRIMARY CARE | Facility: CLINIC | Age: 70
End: 2025-08-15
Payer: MEDICARE

## 2025-08-15 VITALS
HEIGHT: 72 IN | WEIGHT: 211 LBS | SYSTOLIC BLOOD PRESSURE: 150 MMHG | OXYGEN SATURATION: 98 % | BODY MASS INDEX: 28.58 KG/M2 | HEART RATE: 56 BPM | DIASTOLIC BLOOD PRESSURE: 79 MMHG

## 2025-08-15 DIAGNOSIS — F41.9 ANXIETY: ICD-10-CM

## 2025-08-15 DIAGNOSIS — I10 PRIMARY HYPERTENSION: ICD-10-CM

## 2025-08-15 DIAGNOSIS — G47.00 INSOMNIA, UNSPECIFIED TYPE: ICD-10-CM

## 2025-08-15 DIAGNOSIS — I10 HYPERTENSION, UNSPECIFIED TYPE: Primary | ICD-10-CM

## 2025-08-15 DIAGNOSIS — Z00.00 HEALTH CARE MAINTENANCE: ICD-10-CM

## 2025-08-15 DIAGNOSIS — Z00.00 MEDICARE ANNUAL WELLNESS VISIT, SUBSEQUENT: ICD-10-CM

## 2025-08-15 DIAGNOSIS — E78.5 HYPERLIPIDEMIA, UNSPECIFIED HYPERLIPIDEMIA TYPE: ICD-10-CM

## 2025-08-15 DIAGNOSIS — N40.1 BENIGN PROSTATIC HYPERPLASIA WITH LOWER URINARY TRACT SYMPTOMS, SYMPTOM DETAILS UNSPECIFIED: ICD-10-CM

## 2025-08-15 DIAGNOSIS — Z12.5 PROSTATE CANCER SCREENING: ICD-10-CM

## 2025-08-15 DIAGNOSIS — I25.10 CORONARY ARTERY CALCIFICATION: ICD-10-CM

## 2025-08-15 PROBLEM — H35.3222 EXUDATIVE AGE-RELATED MACULAR DEGENERATION, LEFT EYE, WITH INACTIVE CHOROIDAL NEOVASCULARIZATION: Status: RESOLVED | Noted: 2023-08-11 | Resolved: 2025-08-15

## 2025-08-15 PROCEDURE — 99397 PER PM REEVAL EST PAT 65+ YR: CPT | Performed by: INTERNAL MEDICINE

## 2025-08-15 PROCEDURE — 3008F BODY MASS INDEX DOCD: CPT | Performed by: INTERNAL MEDICINE

## 2025-08-15 PROCEDURE — 1170F FXNL STATUS ASSESSED: CPT | Performed by: INTERNAL MEDICINE

## 2025-08-15 PROCEDURE — 3078F DIAST BP <80 MM HG: CPT | Performed by: INTERNAL MEDICINE

## 2025-08-15 PROCEDURE — 3077F SYST BP >= 140 MM HG: CPT | Performed by: INTERNAL MEDICINE

## 2025-08-15 PROCEDURE — 1159F MED LIST DOCD IN RCRD: CPT | Performed by: INTERNAL MEDICINE

## 2025-08-15 RX ORDER — FINASTERIDE 5 MG/1
5 TABLET, FILM COATED ORAL DAILY
Qty: 90 TABLET | Refills: 3 | Status: SHIPPED | OUTPATIENT
Start: 2025-08-15

## 2025-08-15 RX ORDER — TRAZODONE HYDROCHLORIDE 100 MG/1
100 TABLET ORAL NIGHTLY
Qty: 90 TABLET | Refills: 3 | Status: SHIPPED | OUTPATIENT
Start: 2025-08-15

## 2025-08-15 RX ORDER — AMLODIPINE BESYLATE 10 MG/1
10 TABLET ORAL DAILY
Qty: 90 TABLET | Refills: 3 | Status: SHIPPED | OUTPATIENT
Start: 2025-08-15 | End: 2026-08-15

## 2025-08-15 RX ORDER — FLUOXETINE HYDROCHLORIDE 40 MG/1
40 CAPSULE ORAL DAILY
Qty: 90 CAPSULE | Refills: 3 | Status: SHIPPED | OUTPATIENT
Start: 2025-08-15

## 2025-08-15 ASSESSMENT — PATIENT HEALTH QUESTIONNAIRE - PHQ9
1. LITTLE INTEREST OR PLEASURE IN DOING THINGS: NOT AT ALL
SUM OF ALL RESPONSES TO PHQ9 QUESTIONS 1 AND 2: 0
2. FEELING DOWN, DEPRESSED OR HOPELESS: NOT AT ALL

## 2025-08-15 ASSESSMENT — ACTIVITIES OF DAILY LIVING (ADL)
MANAGING_FINANCES: INDEPENDENT
DRESSING: INDEPENDENT
TAKING_MEDICATION: INDEPENDENT
BATHING: INDEPENDENT
GROCERY_SHOPPING: INDEPENDENT
DOING_HOUSEWORK: INDEPENDENT

## 2025-08-15 ASSESSMENT — ENCOUNTER SYMPTOMS
DEPRESSION: 0
OCCASIONAL FEELINGS OF UNSTEADINESS: 0
LOSS OF SENSATION IN FEET: 0

## 2025-08-23 PROBLEM — R00.1 SINUS BRADYCARDIA: Status: RESOLVED | Noted: 2023-07-12 | Resolved: 2025-08-23

## 2025-08-25 ENCOUNTER — APPOINTMENT (OUTPATIENT)
Dept: ALLERGY | Facility: CLINIC | Age: 70
End: 2025-08-25
Payer: MEDICARE

## 2025-09-02 LAB
NON-UH HIE A/G RATIO: 1.2
NON-UH HIE ALB: 3.6 G/DL (ref 3.4–5)
NON-UH HIE ALK PHOS: 45 UNIT/L (ref 45–117)
NON-UH HIE BILIRUBIN, TOTAL: 0.8 MG/DL (ref 0.3–1.2)
NON-UH HIE BUN/CREAT RATIO: 9.1
NON-UH HIE BUN: 10 MG/DL (ref 9–23)
NON-UH HIE CALCIUM: 9.4 MG/DL (ref 8.7–10.4)
NON-UH HIE CALCULATED LDL CHOLESTEROL: 111 MG/DL (ref 60–130)
NON-UH HIE CALCULATED OSMOLALITY: 282 MOSM/KG (ref 275–295)
NON-UH HIE CHLORIDE: 103 MMOL/L (ref 98–107)
NON-UH HIE CHOLESTEROL: 177 MG/DL (ref 100–200)
NON-UH HIE CO2, VENOUS: 31 MMOL/L (ref 20–31)
NON-UH HIE CREATININE: 1.1 MG/DL (ref 0.6–1.1)
NON-UH HIE GFR AA: >60
NON-UH HIE GLOBULIN: 2.9 G/DL
NON-UH HIE GLOMERULAR FILTRATION RATE: >60 ML/MIN/1.73M?
NON-UH HIE GLUCOSE: 102 MG/DL (ref 74–106)
NON-UH HIE GOT: 10 UNIT/L (ref 15–37)
NON-UH HIE GPT: 12 UNIT/L (ref 10–49)
NON-UH HIE HCT: 47 % (ref 41–52)
NON-UH HIE HDL CHOLESTEROL: 38 MG/DL (ref 40–60)
NON-UH HIE HGB: 15.9 G/DL (ref 13.5–17.5)
NON-UH HIE INSTR WBC ND: 5.3
NON-UH HIE K: 3.9 MMOL/L (ref 3.5–5.1)
NON-UH HIE MCH: 29.9 PG (ref 27–34)
NON-UH HIE MCHC: 33.8 G/DL (ref 32–37)
NON-UH HIE MCV: 88.7 FL (ref 80–100)
NON-UH HIE MPV: 7.7 FL (ref 7.4–10.4)
NON-UH HIE NA: 142 MMOL/L (ref 135–145)
NON-UH HIE PLATELET: 278 X10 (ref 150–450)
NON-UH HIE RBC: 5.3 X10 (ref 4.7–6.1)
NON-UH HIE RDW: 12.6 % (ref 11.5–14.5)
NON-UH HIE TOTAL CHOL/HDL CHOL RATIO: 4.7
NON-UH HIE TOTAL PROTEIN: 6.5 G/DL (ref 5.7–8.2)
NON-UH HIE TRIGLYCERIDES: 141 MG/DL (ref 30–150)
NON-UH HIE TSH: 1.25 UIU/ML (ref 0.55–4.78)
NON-UH HIE WBC: 5.3 X10 (ref 4.5–11)

## 2025-09-03 LAB
NON-UH HIE PSA FR PER: 14 %
NON-UH HIE PSA FREE: 0.2 NG/ML
NON-UH HIE PSA TOTAL (ARUP): 1.4 NG/ML (ref 0–4)

## 2025-09-04 ENCOUNTER — SPECIALTY PHARMACY (OUTPATIENT)
Dept: PHARMACY | Facility: CLINIC | Age: 70
End: 2025-09-04

## 2025-09-05 ENCOUNTER — SPECIALTY PHARMACY (OUTPATIENT)
Dept: PHARMACY | Facility: CLINIC | Age: 70
End: 2025-09-05